# Patient Record
Sex: MALE | Race: WHITE | NOT HISPANIC OR LATINO | Employment: OTHER | URBAN - METROPOLITAN AREA
[De-identification: names, ages, dates, MRNs, and addresses within clinical notes are randomized per-mention and may not be internally consistent; named-entity substitution may affect disease eponyms.]

---

## 2018-08-17 ENCOUNTER — APPOINTMENT (OUTPATIENT)
Dept: RADIOLOGY | Age: 58
End: 2018-08-17
Payer: MEDICARE

## 2018-08-17 ENCOUNTER — TRANSCRIBE ORDERS (OUTPATIENT)
Dept: ADMINISTRATIVE | Age: 58
End: 2018-08-17

## 2018-08-17 DIAGNOSIS — M79.642 PAIN OF LEFT HAND: Primary | ICD-10-CM

## 2018-08-17 DIAGNOSIS — M79.642 PAIN OF LEFT HAND: ICD-10-CM

## 2018-08-17 PROCEDURE — 73130 X-RAY EXAM OF HAND: CPT

## 2019-01-30 ENCOUNTER — OFFICE VISIT (OUTPATIENT)
Dept: VASCULAR SURGERY | Facility: CLINIC | Age: 59
End: 2019-01-30
Payer: MEDICARE

## 2019-01-30 VITALS
TEMPERATURE: 96.9 F | DIASTOLIC BLOOD PRESSURE: 88 MMHG | WEIGHT: 172 LBS | SYSTOLIC BLOOD PRESSURE: 136 MMHG | BODY MASS INDEX: 25.48 KG/M2 | HEIGHT: 69 IN | HEART RATE: 72 BPM

## 2019-01-30 DIAGNOSIS — I70.211 ATHEROSCLEROSIS OF NATIVE ARTERY OF RIGHT LOWER EXTREMITY WITH INTERMITTENT CLAUDICATION (HCC): ICD-10-CM

## 2019-01-30 DIAGNOSIS — I80.8 SUPERFICIAL THROMBOPHLEBITIS OF LEFT UPPER EXTREMITY: Primary | ICD-10-CM

## 2019-01-30 PROBLEM — M54.12 CERVICAL RADICULOPATHY: Status: ACTIVE | Noted: 2017-11-17

## 2019-01-30 PROBLEM — M50.90 CERVICAL DISC DISEASE: Status: ACTIVE | Noted: 2017-11-17

## 2019-01-30 PROBLEM — M25.532 LEFT WRIST PAIN: Status: ACTIVE | Noted: 2019-01-15

## 2019-01-30 PROBLEM — E78.5 DYSLIPIDEMIA: Status: ACTIVE | Noted: 2019-01-15

## 2019-01-30 PROBLEM — I70.219 ATHEROSCLER NATIVE ARTERIES THE EXTREMITIES W/INTERMIT CLAUDICATION (HCC): Status: ACTIVE | Noted: 2019-01-30

## 2019-01-30 PROBLEM — K40.91 RECURRENT RIGHT INGUINAL HERNIA: Status: ACTIVE | Noted: 2017-01-31

## 2019-01-30 PROBLEM — I80.9 SUPERFICIAL THROMBOPHLEBITIS: Status: ACTIVE | Noted: 2019-01-30

## 2019-01-30 PROCEDURE — 99204 OFFICE O/P NEW MOD 45 MIN: CPT | Performed by: SURGERY

## 2019-01-30 RX ORDER — OXYCODONE HYDROCHLORIDE AND ACETAMINOPHEN 5; 325 MG/1; MG/1
1 TABLET ORAL DAILY PRN
Refills: 0 | COMMUNITY
Start: 2018-12-17 | End: 2019-02-27 | Stop reason: HOSPADM

## 2019-01-30 RX ORDER — SILDENAFIL CITRATE 20 MG/1
TABLET ORAL
COMMUNITY
Start: 2017-06-01

## 2019-01-30 RX ORDER — AZITHROMYCIN 250 MG/1
TABLET, FILM COATED ORAL
Refills: 0 | COMMUNITY
Start: 2018-12-31 | End: 2019-01-30 | Stop reason: ALTCHOICE

## 2019-01-30 RX ORDER — CYCLOBENZAPRINE HCL 10 MG
TABLET ORAL
COMMUNITY
Start: 2017-07-12

## 2019-01-30 RX ORDER — NAPROXEN 500 MG/1
500 TABLET ORAL DAILY
COMMUNITY
Start: 2018-02-19

## 2019-01-30 RX ORDER — DIAZEPAM 5 MG/1
TABLET ORAL
COMMUNITY
Start: 2017-12-18 | End: 2019-02-27 | Stop reason: HOSPADM

## 2019-01-30 RX ORDER — MELOXICAM 7.5 MG/1
7.5 TABLET ORAL
COMMUNITY
End: 2019-02-27 | Stop reason: HOSPADM

## 2019-01-30 NOTE — PROGRESS NOTES
Assessment/Plan:    Pt is a 58 yo M w/ chronic HCV, cervical spine disease w/ radilulopathy, ED, diverticulosis, HLD, L wrist pain, R inguinal hernia s/p repair with recurrence, PAD, SVT    Superficial thrombophlebitis of left upper extremity  -     VAS upper limb venous duplex scan, unilateral/limited; Future  -per report, L forearm SVT seen on MRI of hand/wrist  -as symptoms start 3 mos ago, there is only small lumpy areas remaining that may or may not represent SVT  -will get UEV to evaluate for SVT and also for DVT as his initial symptoms was hand swelling    Atherosclerosis of native artery of right lower extremity with intermittent claudication (HCC)  -     VAS lower limb arterial duplex, complete bilateral; Future  -based on sxs and exam, likely PAD w/ RLE claudication  -will get LEADs to better evaluate    Smoking  -current 1PPD smoker  -discussed relationship of smoking to PAD and importance of cessation if we are going to proceed with intervention for his claudication; he has agreed to try cessation    F/U: after ultrasounds complete    Subjective:      Patient ID: Kayli Ballesteros is a 59 y o  male  Patient is new to the practice and was referred by orthopedic at [facility]  Patient complains of numbness and tingling in fingers on right hand  He states that his left hand swelled 4-5 months ago and then developed 2 bumps in inside of left forearm  He states that they do cause him some discomfort at times when he is doing a lot of work with his hand  Denies coldness  Patient is a smoker  HPI:    Patient referred by ortho for evaluation of L arm lumps     ~4mos ago, patient had swelling of his L hand  He saw ortho hand and had an injection in his hand  His swelling slowly resolved but he noticed two "lumps" on his forearm  These have been resolving slowly  He denies any pain in the area  Denies any other instances of this    Denies hx of DVT    Patient complains of cramping in his R calf after walking ~20ft, especially up an incline or with stairs  Denies LLE symptoms  Denies rest pain or wounds  He is a 1 PPD smoker  Not trying to quit        The following portions of the patient's history were reviewed and updated as appropriate: allergies, current medications, past family history, past medical history, past social history, past surgical history and problem list     Review of Systems   Constitutional: Negative  HENT: Negative  Eyes: Negative  Respiratory: Positive for shortness of breath (with activity)  Cardiovascular: Negative  Negative for leg swelling  Gastrointestinal: Negative  Endocrine: Negative  Genitourinary: Negative  Musculoskeletal: Positive for back pain  R calf claudicaiton   Skin: Negative  Allergic/Immunologic: Negative  Neurological: Negative  Hematological: Negative  Psychiatric/Behavioral: Negative  Objective:      /88 (BP Location: Right arm, Patient Position: Sitting)   Pulse 72   Temp (!) 96 9 °F (36 1 °C) (Tympanic)   Ht 5' 9" (1 753 m)   Wt 78 kg (172 lb)   BMI 25 40 kg/m²          Physical Exam   Constitutional: He is oriented to person, place, and time  He appears well-developed and well-nourished  HENT:   Head: Normocephalic and atraumatic  Eyes: Conjunctivae are normal    Neck: Normal range of motion  Neck supple  Cardiovascular: Normal rate, regular rhythm and normal heart sounds  No murmur heard  Pulses:       Radial pulses are 2+ on the right side, and 2+ on the left side  Femoral pulses are 2+ on the right side, and 2+ on the left side  Popliteal pulses are 0 on the right side  Dorsalis pedis pulses are 0 on the right side, and 2+ on the left side  Posterior tibial pulses are 0 on the right side, and 0 on the left side  No carotid bruits B   Pulmonary/Chest: Effort normal  No respiratory distress  He has no wheezes  He has rales  Abdominal: Soft   He exhibits no distension  There is no tenderness  There is no rebound  Musculoskeletal: Normal range of motion  He exhibits no edema  Neurological: He is alert and oriented to person, place, and time  Skin: Skin is warm and dry  L volar forearm with two areas of lumpiness; no erythema, no tenderness   Psychiatric: He has a normal mood and affect  His behavior is normal    Nursing note and vitals reviewed  Vitals:    01/30/19 1336   BP: 136/88   BP Location: Right arm   Patient Position: Sitting   Pulse: 72   Temp: (!) 96 9 °F (36 1 °C)   TempSrc: Tympanic   Weight: 78 kg (172 lb)   Height: 5' 9" (1 753 m)       Patient Active Problem List   Diagnosis    Cervical disc disease    Cervical radiculopathy    Chronic hepatitis C without hepatic coma (HCC)    Diverticulosis of large intestine without hemorrhage    Dyslipidemia    Erectile dysfunction    Left wrist pain    Lumbar disc disease with radiculopathy    Recurrent right inguinal hernia    Tobacco abuse disorder    Umbilical hernia without obstruction and without gangrene    Superficial thrombophlebitis    Atheroscler native arteries the extremities w/intermit claudication (HCC)       Past Surgical History:   Procedure Laterality Date    HERNIA REPAIR      NECK SURGERY  11/2016       Family History   Problem Relation Age of Onset    Diabetes Mother     Cancer Father        Social History     Social History    Marital status: Single     Spouse name: N/A    Number of children: N/A    Years of education: N/A     Occupational History    Not on file       Social History Main Topics    Smoking status: Current Every Day Smoker     Packs/day: 1 00     Types: Cigarettes    Smokeless tobacco: Never Used    Alcohol use Not on file    Drug use: Unknown    Sexual activity: Not on file     Other Topics Concern    Not on file     Social History Narrative    No narrative on file       Allergies   Allergen Reactions    Penicillins Current Outpatient Prescriptions:     cyclobenzaprine (FLEXERIL) 10 mg tablet, TAKE 1 TABLET (10 MG TOTAL) BY MOUTH 2 (TWO) TIMES A DAY AS NEEDED FOR MUSCLE SPASMS , Disp: , Rfl:     naproxen (NAPROSYN) 500 mg tablet, Take 500 mg by mouth, Disp: , Rfl:     oxyCODONE-acetaminophen (PERCOCET) 5-325 mg per tablet, Take 1 tablet by mouth daily as needed, Disp: , Rfl: 0    sildenafil (REVATIO) 20 mg tablet, 3 TO 5 PILLS ORALLY X SINGLE DOSE DAILY PRN, Disp: , Rfl:     diazepam (VALIUM) 5 mg tablet, TAKE 1 TABLET EVERY 8 HOURS AS NEEDED MUSCLE SPASM, Disp: , Rfl:     meloxicam (MOBIC) 7 5 mg tablet, Take 7 5 mg by mouth, Disp: , Rfl:

## 2019-01-30 NOTE — PATIENT INSTRUCTIONS
1) SVT  -the MRI you had showed clot in the superficial veins in your arm; this is not dangerous  -if it happens again, you can use NSAIDs and warm compresses to relieve the discomfort  -we are going to get an ultrasound to make sure there is no deeper clot which can be more dangerous    2) PAD  -the cramping you are having in your calf is called claudication  -we will get an ultrasound to evaluate the level of disease in your blood vessels  -f/u afterward to discuss options for treatment

## 2019-02-06 ENCOUNTER — HOSPITAL ENCOUNTER (OUTPATIENT)
Dept: NON INVASIVE DIAGNOSTICS | Facility: CLINIC | Age: 59
Discharge: HOME | End: 2019-02-06
Payer: MEDICARE

## 2019-02-06 DIAGNOSIS — I80.8 SUPERFICIAL THROMBOPHLEBITIS OF LEFT UPPER EXTREMITY: ICD-10-CM

## 2019-02-06 DIAGNOSIS — I70.211 ATHEROSCLEROSIS OF NATIVE ARTERY OF RIGHT LOWER EXTREMITY WITH INTERMITTENT CLAUDICATION (HCC): ICD-10-CM

## 2019-02-06 PROCEDURE — 93925 LOWER EXTREMITY STUDY: CPT

## 2019-02-06 PROCEDURE — 93925 LOWER EXTREMITY STUDY: CPT | Performed by: SURGERY

## 2019-02-06 PROCEDURE — 93923 UPR/LXTR ART STDY 3+ LVLS: CPT

## 2019-02-06 PROCEDURE — 93922 UPR/L XTREMITY ART 2 LEVELS: CPT | Performed by: SURGERY

## 2019-02-06 PROCEDURE — 93971 EXTREMITY STUDY: CPT

## 2019-02-06 PROCEDURE — 93971 EXTREMITY STUDY: CPT | Performed by: SURGERY

## 2019-02-11 ENCOUNTER — OFFICE VISIT (OUTPATIENT)
Dept: VASCULAR SURGERY | Facility: CLINIC | Age: 59
End: 2019-02-11
Payer: MEDICARE

## 2019-02-11 VITALS
SYSTOLIC BLOOD PRESSURE: 142 MMHG | HEART RATE: 96 BPM | WEIGHT: 173 LBS | HEIGHT: 69 IN | BODY MASS INDEX: 25.62 KG/M2 | DIASTOLIC BLOOD PRESSURE: 98 MMHG | TEMPERATURE: 98.1 F

## 2019-02-11 DIAGNOSIS — I70.211 ATHEROSCLEROSIS OF NATIVE ARTERY OF RIGHT LOWER EXTREMITY WITH INTERMITTENT CLAUDICATION (HCC): Primary | ICD-10-CM

## 2019-02-11 DIAGNOSIS — I80.8 SUPERFICIAL THROMBOPHLEBITIS OF LEFT UPPER EXTREMITY: ICD-10-CM

## 2019-02-11 PROCEDURE — 99215 OFFICE O/P EST HI 40 MIN: CPT | Performed by: SURGERY

## 2019-02-11 RX ORDER — ASPIRIN 81 MG/1
81 TABLET ORAL DAILY
Start: 2019-02-11

## 2019-02-11 RX ORDER — ATORVASTATIN CALCIUM 20 MG/1
20 TABLET, FILM COATED ORAL DAILY
Qty: 90 TABLET | Refills: 4 | Status: SHIPPED | OUTPATIENT
Start: 2019-02-11 | End: 2019-02-27 | Stop reason: HOSPADM

## 2019-02-11 RX ORDER — SODIUM CHLORIDE 9 MG/ML
125 INJECTION, SOLUTION INTRAVENOUS CONTINUOUS
Status: CANCELLED | OUTPATIENT
Start: 2019-02-11

## 2019-02-11 NOTE — PROGRESS NOTES
Assessment/Plan:    Pt is a 58 yo M w/ chronic HCV, cervical spine disease w/ radilulopathy, ED, diverticulosis, HLD, L wrist pain, LUE SVT, R inguinal hernia s/p repair with recurrence, PAD w/ claudication  Atherosclerosis of native artery of right lower extremity with intermittent claudication (HCC)  -     aspirin (ECOTRIN LOW STRENGTH) 81 mg EC tablet; Take 1 tablet (81 mg total) by mouth daily  -     atorvastatin (LIPITOR) 20 mg tablet; Take 1 tablet (20 mg total) by mouth daily  -     Basic metabolic panel; Future  -     CBC and Platelet; Future  -     Protime-INR;  Future  -based on sxs and exam, likely PAD w/ RLE claudication  -reviewed LEADs which show R: 0 78/52/27 and L: 1 17/71/59; R SFA occlusion, L popliteal stenosis; unclear why exercise ABIs were performed but they showed drop to 0 48/1 1  -discussed pathophysiology of PAD and options for treatment including medical management vs intervention with angiogram; discussed risks and benefits of each; I think patient is a good candidate for angiogram, but he must stop smoking first to increase chances of long term patency; patient is agreeable to this; understands that procedure will be cancelled if he is still smoking  -plan for RLE angiogram, L femoral access  -labs (pt planning to get these at [facility] with some other bloodwork he is due for; instructed him to request forwarding to me)    Superficial thrombophlebitis of left upper extremity  -per report, L forearm SVT seen on MRI of hand/wrist  -as symptoms start 3 mos ago, there is only small lumpy areas remaining that may or may not represent SVT  -LUE nena DU was neg for DVT or SVT; possible intramuscular mass vs hematoma, however this was not noted on MRI which would have been more sensative  -as this is not a vascular issue, recommended that patient address any further concerns of lumps or hand swelling to his PCP or ortho    Smoking  -current 1/2PPD smoker (decreased from 1PPD at last visit)  -discussed relationship of smoking to PAD and importance of cessation if we are going to proceed with intervention for his claudication; he has agreed to stop smoking today in preparation for procedure    Medications  -will start patient on aspirin 81mg once daily and atrovastatin 20mg qhs for best medical management of PAD    Other orders  -     Notify Physician in PT/INR greater than 1 8 and/or Creatine Clearance (gFR)  is less than 60  ml/sergio/1 73sq m; Standing  -     Height and Weight Upon Arrival; Standing  -     Nursing communcation Apply snapless gown prior to procedure; Standing  -     Have Patient Void On Call to Procedure Room; Standing  -     Insert and Maintain IV; Standing  -     IR abdominal angiography; Standing  -     Nursing communcation Confirm last dose of any anticoagulation and antiplatelet medication and notify IR; Standing  -     Nursing communcation If patient on diabetic medications, nurse to notify physician to ensure the medications are adjusted preprocedure ; Standing  -     sodium chloride 0 9 % infusion  -     IR abdominal angiography      Operative Scheduling Information:    Hospital:  IR    Physician:  Me    Surgery: RLE angiogram, L femoral access    Urgency:  Standard (patient must have stopped smoking; please ask when scheduling him)    Case Length:  Normal    Post-op Bed:  Outpatient    OR Table:  IR    Equipment Needs:  None    Medication Instructions:  Aspirin:   Continue (do not hold)    Hydration:  No      Subjective:      Patient ID: Claudia Perez is a 59 y o  male  Patient presents today to discuss results of upper limb venous duplex and JANA  Patient complains of minimal left hand swelling  He also complains of tightness, cramping and pain in right leg when walking and is worsened with steps or elevation  Denies rest pain  Patient is a smoker  HPI:    Patient initially referred by ortho for evaluation of L arm lumps    Presents today to review studies  ~4mos ago, patient had swelling of his L hand  He saw ortho hand and had an injection in his hand  His swelling slowly resolved but he noticed two "lumps" on his forearm  These have been resolving slowly  He denies any pain in the area  Denies any other instances of this  Denies hx of DVT    Patient complains of cramping in his R calf after walking ~20ft, especially up an incline or with stairs  Denies LLE symptoms (maybe with very long distance? ?)  Denies rest pain or wounds  He is a 1/2 PPD smoker, down from 1PPD at last visit  He seems motivated to quit to get his PAD fixed  The following portions of the patient's history were reviewed and updated as appropriate: allergies, current medications, past family history, past medical history, past social history, past surgical history and problem list       Review of Systems   Constitutional: Negative  HENT: Negative  Eyes: Negative  Respiratory: Negative for shortness of breath  Cardiovascular: Negative  Negative for chest pain and leg swelling  Gastrointestinal: Negative  Endocrine: Negative  Genitourinary: Negative  Musculoskeletal: Positive for gait problem (RLE claudication <1block)  L forearm bumps    L hand swelling   Skin: Negative  Allergic/Immunologic: Negative  Hematological: Negative  Psychiatric/Behavioral: Negative  Objective:      /98 (BP Location: Right arm, Patient Position: Sitting)   Pulse 96   Temp 98 1 °F (36 7 °C) (Tympanic)   Ht 5' 9" (1 753 m)   Wt 78 5 kg (173 lb)   BMI 25 55 kg/m²          Physical Exam   Constitutional: He is oriented to person, place, and time  He appears well-developed and well-nourished  HENT:   Head: Normocephalic and atraumatic  Eyes: Conjunctivae are normal    Neck: Normal range of motion  Neck supple  Cardiovascular: Normal rate, regular rhythm and normal heart sounds  No murmur heard    Pulses:       Radial pulses are 2+ on the right side, and 2+ on the left side  Femoral pulses are 2+ on the right side, and 2+ on the left side  Popliteal pulses are 0 on the right side  Dorsalis pedis pulses are 0 on the right side, and 2+ on the left side  Posterior tibial pulses are 0 on the right side, and 0 on the left side  No carotid bruits B   Pulmonary/Chest: Effort normal  No respiratory distress  He has no wheezes  He has rales  Abdominal: Soft  He exhibits no distension  There is no tenderness  There is no rebound  Musculoskeletal: Normal range of motion  He exhibits no edema  Neurological: He is alert and oriented to person, place, and time  Skin: Skin is warm and dry  L volar forearm with two areas of lumpiness; no erythema, no tenderness   Psychiatric: He has a normal mood and affect  His behavior is normal    Nursing note and vitals reviewed          Vitals:    02/11/19 1403   BP: 142/98   BP Location: Right arm   Patient Position: Sitting   Pulse: 96   Temp: 98 1 °F (36 7 °C)   TempSrc: Tympanic   Weight: 78 5 kg (173 lb)   Height: 5' 9" (1 753 m)       Patient Active Problem List   Diagnosis    Cervical disc disease    Cervical radiculopathy    Chronic hepatitis C without hepatic coma (HCC)    Diverticulosis of large intestine without hemorrhage    Dyslipidemia    Erectile dysfunction    Left wrist pain    Lumbar disc disease with radiculopathy    Recurrent right inguinal hernia    Tobacco abuse disorder    Umbilical hernia without obstruction and without gangrene    Superficial thrombophlebitis    Atheroscler native arteries the extremities w/intermit claudication [facility])       Past Surgical History:   Procedure Laterality Date    HERNIA REPAIR      NECK SURGERY  11/2016       Family History   Problem Relation Age of Onset    Diabetes Mother     Cancer Father        Social History     Socioeconomic History    Marital status: Single     Spouse name: Not on file    Number of children: Not on file    Years of education: Not on file    Highest education level: Not on file   Occupational History    Not on file   Social Needs    Financial resource strain: Not on file    Food insecurity:     Worry: Not on file     Inability: Not on file    Transportation needs:     Medical: Not on file     Non-medical: Not on file   Tobacco Use    Smoking status: Current Every Day Smoker     Packs/day: 0 50     Types: Cigarettes    Smokeless tobacco: Never Used   Substance and Sexual Activity    Alcohol use: Not on file    Drug use: Not on file    Sexual activity: Not on file   Lifestyle    Physical activity:     Days per week: Not on file     Minutes per session: Not on file    Stress: Not on file   Relationships    Social connections:     Talks on phone: Not on file     Gets together: Not on file     Attends [employer] service: Not on file     Active member of club or organization: Not on file     Attends meetings of clubs or organizations: Not on file     Relationship status: Not on file    Intimate partner violence:     Fear of current or ex partner: Not on file     Emotionally abused: Not on file     Physically abused: Not on file     Forced sexual activity: Not on file   Other Topics Concern    Not on file   Social History Narrative    Not on file       Allergies   Allergen Reactions    Penicillins          Current Outpatient Medications:     cyclobenzaprine (FLEXERIL) 10 mg tablet, TAKE 1 TABLET (10 MG TOTAL) BY MOUTH 2 (TWO) TIMES A DAY AS NEEDED FOR MUSCLE SPASMS , Disp: , Rfl:     diazepam (VALIUM) 5 mg tablet, TAKE 1 TABLET EVERY 8 HOURS AS NEEDED MUSCLE SPASM, Disp: , Rfl:     meloxicam (MOBIC) 7 5 mg tablet, Take 7 5 mg by mouth, Disp: , Rfl:     naproxen (NAPROSYN) 500 mg tablet, Take 500 mg by mouth, Disp: , Rfl:     oxyCODONE-acetaminophen (PERCOCET) 5-325 mg per tablet, Take 1 tablet by mouth daily as needed, Disp: , Rfl: 0    sildenafil (REVATIO) 20 mg tablet, 3 TO 5 PILLS ORALLY X SINGLE DOSE DAILY PRN, Disp: , Rfl:     aspirin (ECOTRIN LOW STRENGTH) 81 mg EC tablet, Take 1 tablet (81 mg total) by mouth daily, Disp: , Rfl:     atorvastatin (LIPITOR) 20 mg tablet, Take 1 tablet (20 mg total) by mouth daily, Disp: 90 tablet, Rfl: 4

## 2019-02-11 NOTE — PATIENT INSTRUCTIONS
1) SVT  -the MRI you had showed clot in the superficial veins in your arm; this is not dangerous  -if it happens again, you can use NSAIDs and warm compresses to relieve the discomfort  -the ultrasound did not show any clot in any of the veins; it possibly showed something in the muscle, however, this was not mentioned on the MRI  -if the lumps are still bothering you, please discuss next steps for workup with your PCP    2) PAD  -the cramping you are having in your calf is called claudication  -the ultrasound shows blockages in the right thigh and at the left knee  -we are planning an angiogram to try and treat these blockages  -in order for this to have the greatest chance of success, I need you to stop smoking; if you are still smoking on the day of the procedure, it will be cancelled    Angiogram   WHAT YOU NEED TO KNOW:   What do I need to know about an angiogram?  An angiogram is used to examine blood flow through your arteries  Arteries carry blood from your heart to your body  How do I prepare for the procedure? Your healthcare provider will tell you how to prepare for your procedure  He may tell you not to eat or drink anything after midnight on the day of your procedure  He will tell you what medicines to take or not take on the day of your procedure  Contrast liquid will be used during the procedure to help your arteries show up better in the pictures  Tell your healthcare provider if you have ever had an allergic reaction to contrast liquid  Arrange to have someone drive you home after your procedure  What will happen during the procedure? · You may be given medicine to help you relax or make you drowsy  You may get local anesthesia to numb the area where the angiogram catheter will go in  Hair may be removed from the procedure site  · A catheter will be put into an artery in your leg near your groin, or in your arm  The catheter travels through the artery to the area being studied   Contrast liquid is put through the catheter to help your blood vessels and organs show up better in the x-ray pictures  You may feel warm as the liquid is put into the catheter  You may get a headache or feel nauseated  These feelings should go away quickly  · Your healthcare providers will remove the catheter and apply pressure to the wound for several minutes  They may place a pressure bandage or other pressure device over the wound to help stop any bleeding  What will happen after the procedure? You will be on a heart monitor until you are fully awake  A heart monitor continuously records the electrical activity of your heart  Healthcare providers will frequently monitor your vital signs and pulses  They will also frequently check your wound for bleeding  Keep your leg straight  Do not  get out of bed until your healthcare provider says it is okay  After you are monitored for several hours, you may be able to go home  What are the risks of the procedure? · You may bleed heavily after your catheter is removed  The catheter may damage your artery, and you may need surgery to fix the damage  You could have kidney problems from the contrast liquid  You could have an allergic reaction to the contrast liquid or numbing medicine  · You may develop a blood clot that causes pain and swelling, and stops blood from flowing  A blood clot in your leg can break loose and travel to your lungs and become life-threatening  CARE AGREEMENT:   You have the right to help plan your care  Learn about your health condition and how it may be treated  Discuss treatment options with your caregivers to decide what care you want to receive  You always have the right to refuse treatment  The above information is an  only  It is not intended as medical advice for individual conditions or treatments  Talk to your doctor, nurse or pharmacist before following any medical regimen to see if it is safe and effective for you    © 2017 [facility] Information is for End User's use only and may not be sold, redistributed or otherwise used for commercial purposes  All illustrations and images included in CareNotes® are the copyrighted property of A D A M , Inc  or [employer]

## 2019-02-13 ENCOUNTER — TELEPHONE (OUTPATIENT)
Dept: VASCULAR SURGERY | Facility: CLINIC | Age: 59
End: 2019-02-13

## 2019-02-13 NOTE — TELEPHONE ENCOUNTER
/LMOM for pt to call back to schedule  Agram with Dr Tatiana Huynh Doctor  Pt returned call and we confirmed Agram for 2/19/19 at [facility]/Cath [facility] with Dr Tatiana Huynh Doctor  Pt will go for blood work ordered  Instructions reviewed and understood

## 2019-02-15 ENCOUNTER — TELEPHONE (OUTPATIENT)
Dept: VASCULAR SURGERY | Facility: CLINIC | Age: 59
End: 2019-02-15

## 2019-02-15 ENCOUNTER — PREP FOR PROCEDURE (OUTPATIENT)
Dept: VASCULAR SURGERY | Facility: CLINIC | Age: 59
End: 2019-02-15

## 2019-02-15 DIAGNOSIS — I70.211 ATHEROSCLEROSIS OF NATIVE ARTERY OF RIGHT LOWER EXTREMITY WITH INTERMITTENT CLAUDICATION (HCC): Primary | ICD-10-CM

## 2019-02-15 NOTE — TELEPHONE ENCOUNTER
LMOM regarding his Agram that is scheduled with Dr Delpha Romberg Doctor on 219/19  Dr Delpha Romberg is now unavailable to do this case  We are rescheduling the Agram to 2/28/19 at [facility] for Dr Wilder Latif to do  I have asked that the patient call me back to confirm

## 2019-02-18 ENCOUNTER — TELEPHONE (OUTPATIENT)
Dept: VASCULAR SURGERY | Facility: CLINIC | Age: 59
End: 2019-02-18

## 2019-02-18 NOTE — TELEPHONE ENCOUNTER
Spoke to patient to reschedule surgery to 2-28-19 [facility] with Dr Nury Flores since Dr Tho Musa is on [facility] Patient was okay with the change   LLF

## 2019-02-23 ENCOUNTER — APPOINTMENT (EMERGENCY)
Dept: RADIOLOGY | Facility: HOSPITAL | Age: 59
DRG: 281 | End: 2019-02-23
Payer: MEDICARE

## 2019-02-23 ENCOUNTER — HOSPITAL ENCOUNTER (INPATIENT)
Facility: HOSPITAL | Age: 59
LOS: 4 days | Discharge: HOME | DRG: 281 | End: 2019-02-27
Attending: EMERGENCY MEDICINE | Admitting: HOSPITALIST
Payer: MEDICARE

## 2019-02-23 DIAGNOSIS — R06.03 ACUTE RESPIRATORY DISTRESS: ICD-10-CM

## 2019-02-23 DIAGNOSIS — M50.90 CERVICAL DISC DISEASE: ICD-10-CM

## 2019-02-23 DIAGNOSIS — R77.8 ELEVATED TROPONIN: ICD-10-CM

## 2019-02-23 DIAGNOSIS — I42.8 NON-ISCHEMIC CARDIOMYOPATHY (HCC): ICD-10-CM

## 2019-02-23 DIAGNOSIS — I50.9 CHF (CONGESTIVE HEART FAILURE) (HCC): Primary | ICD-10-CM

## 2019-02-23 DIAGNOSIS — R94.31 EKG ABNORMALITIES: ICD-10-CM

## 2019-02-23 DIAGNOSIS — I50.21 ACUTE SYSTOLIC (CONGESTIVE) HEART FAILURE (HCC): ICD-10-CM

## 2019-02-23 DIAGNOSIS — E78.5 DYSLIPIDEMIA: Chronic | ICD-10-CM

## 2019-02-23 PROBLEM — I73.9 PERIPHERAL VASCULAR DISEASE (HCC): Chronic | Status: ACTIVE | Noted: 2019-02-23

## 2019-02-23 PROBLEM — R73.09 ELEVATED RANDOM BLOOD GLUCOSE LEVEL: Status: ACTIVE | Noted: 2019-02-23

## 2019-02-23 PROBLEM — R06.00 DYSPNEA ON EXERTION: Status: ACTIVE | Noted: 2019-02-23

## 2019-02-23 PROBLEM — I73.9 PERIPHERAL VASCULAR DISEASE (HCC): Status: ACTIVE | Noted: 2019-02-23

## 2019-02-23 PROBLEM — I21.4 NSTEMI (NON-ST ELEVATED MYOCARDIAL INFARCTION) (HCC): Status: ACTIVE | Noted: 2019-02-23

## 2019-02-23 LAB
ALBUMIN SERPL BCP-MCNC: 3.5 G/DL (ref 3.5–5)
ALP SERPL-CCNC: 85 U/L (ref 46–116)
ALT SERPL W P-5'-P-CCNC: 25 U/L (ref 12–78)
ANION GAP SERPL CALCULATED.3IONS-SCNC: 7 MMOL/L (ref 4–13)
APTT PPP: 30 SECONDS (ref 26–38)
APTT PPP: 39 SECONDS (ref 26–38)
APTT PPP: 45 SECONDS (ref 26–38)
AST SERPL W P-5'-P-CCNC: 11 U/L (ref 5–45)
BASOPHILS # BLD AUTO: 0.08 THOUSANDS/ΜL (ref 0–0.1)
BASOPHILS NFR BLD AUTO: 1 % (ref 0–1)
BILIRUB SERPL-MCNC: 0.24 MG/DL (ref 0.2–1)
BUN SERPL-MCNC: 16 MG/DL (ref 5–25)
CALCIUM SERPL-MCNC: 8.9 MG/DL (ref 8.3–10.1)
CHLORIDE SERPL-SCNC: 105 MMOL/L (ref 100–108)
CO2 SERPL-SCNC: 27 MMOL/L (ref 21–32)
CREAT SERPL-MCNC: 1.11 MG/DL (ref 0.6–1.3)
DEPRECATED D DIMER PPP: 508 NG/ML (FEU)
EOSINOPHIL # BLD AUTO: 0.44 THOUSAND/ΜL (ref 0–0.61)
EOSINOPHIL NFR BLD AUTO: 4 % (ref 0–6)
ERYTHROCYTE [DISTWIDTH] IN BLOOD BY AUTOMATED COUNT: 12.5 % (ref 11.6–15.1)
GFR SERPL CREATININE-BSD FRML MDRD: 72 ML/MIN/1.73SQ M
GLUCOSE SERPL-MCNC: 179 MG/DL (ref 65–140)
HCT VFR BLD AUTO: 48.2 % (ref 36.5–49.3)
HGB BLD-MCNC: 15.4 G/DL (ref 12–17)
IMM GRANULOCYTES # BLD AUTO: 0.04 THOUSAND/UL (ref 0–0.2)
IMM GRANULOCYTES NFR BLD AUTO: 0 % (ref 0–2)
INR PPP: 1.05 (ref 0.86–1.17)
LYMPHOCYTES # BLD AUTO: 2.92 THOUSANDS/ΜL (ref 0.6–4.47)
LYMPHOCYTES NFR BLD AUTO: 28 % (ref 14–44)
MCH RBC QN AUTO: 29.7 PG (ref 26.8–34.3)
MCHC RBC AUTO-ENTMCNC: 32 G/DL (ref 31.4–37.4)
MCV RBC AUTO: 93 FL (ref 82–98)
MONOCYTES # BLD AUTO: 0.77 THOUSAND/ΜL (ref 0.17–1.22)
MONOCYTES NFR BLD AUTO: 8 % (ref 4–12)
NEUTROPHILS # BLD AUTO: 6.08 THOUSANDS/ΜL (ref 1.85–7.62)
NEUTS SEG NFR BLD AUTO: 59 % (ref 43–75)
NRBC BLD AUTO-RTO: 0 /100 WBCS
NT-PROBNP SERPL-MCNC: 3544 PG/ML
PLATELET # BLD AUTO: 430 THOUSANDS/UL (ref 149–390)
PMV BLD AUTO: 8.8 FL (ref 8.9–12.7)
POTASSIUM SERPL-SCNC: 4.4 MMOL/L (ref 3.5–5.3)
PROT SERPL-MCNC: 7.7 G/DL (ref 6.4–8.2)
PROTHROMBIN TIME: 13.8 SECONDS (ref 11.8–14.2)
RBC # BLD AUTO: 5.18 MILLION/UL (ref 3.88–5.62)
SODIUM SERPL-SCNC: 139 MMOL/L (ref 136–145)
TROPONIN I SERPL-MCNC: 0.11 NG/ML
TROPONIN I SERPL-MCNC: 0.4 NG/ML
TROPONIN I SERPL-MCNC: 1.51 NG/ML
TROPONIN I SERPL-MCNC: 10.2 NG/ML
TROPONIN I SERPL-MCNC: 5.56 NG/ML
TROPONIN I SERPL-MCNC: 8.35 NG/ML
WBC # BLD AUTO: 10.33 THOUSAND/UL (ref 4.31–10.16)

## 2019-02-23 PROCEDURE — 83880 ASSAY OF NATRIURETIC PEPTIDE: CPT | Performed by: EMERGENCY MEDICINE

## 2019-02-23 PROCEDURE — 84484 ASSAY OF TROPONIN QUANT: CPT | Performed by: EMERGENCY MEDICINE

## 2019-02-23 PROCEDURE — 99221 1ST HOSP IP/OBS SF/LOW 40: CPT | Performed by: INTERNAL MEDICINE

## 2019-02-23 PROCEDURE — 85610 PROTHROMBIN TIME: CPT | Performed by: HOSPITALIST

## 2019-02-23 PROCEDURE — 99223 1ST HOSP IP/OBS HIGH 75: CPT | Performed by: HOSPITALIST

## 2019-02-23 PROCEDURE — 71046 X-RAY EXAM CHEST 2 VIEWS: CPT

## 2019-02-23 PROCEDURE — RECHECK: Performed by: PHYSICIAN ASSISTANT

## 2019-02-23 PROCEDURE — 85025 COMPLETE CBC W/AUTO DIFF WBC: CPT | Performed by: EMERGENCY MEDICINE

## 2019-02-23 PROCEDURE — 93005 ELECTROCARDIOGRAM TRACING: CPT

## 2019-02-23 PROCEDURE — 85379 FIBRIN DEGRADATION QUANT: CPT | Performed by: EMERGENCY MEDICINE

## 2019-02-23 PROCEDURE — 80053 COMPREHEN METABOLIC PANEL: CPT | Performed by: EMERGENCY MEDICINE

## 2019-02-23 PROCEDURE — 84484 ASSAY OF TROPONIN QUANT: CPT | Performed by: HOSPITALIST

## 2019-02-23 PROCEDURE — 85730 THROMBOPLASTIN TIME PARTIAL: CPT | Performed by: INTERNAL MEDICINE

## 2019-02-23 PROCEDURE — 84484 ASSAY OF TROPONIN QUANT: CPT | Performed by: INTERNAL MEDICINE

## 2019-02-23 PROCEDURE — 99285 EMERGENCY DEPT VISIT HI MDM: CPT

## 2019-02-23 PROCEDURE — 36415 COLL VENOUS BLD VENIPUNCTURE: CPT | Performed by: EMERGENCY MEDICINE

## 2019-02-23 PROCEDURE — 85730 THROMBOPLASTIN TIME PARTIAL: CPT | Performed by: HOSPITALIST

## 2019-02-23 RX ORDER — HEPARIN SODIUM 1000 [USP'U]/ML
4000 INJECTION, SOLUTION INTRAVENOUS; SUBCUTANEOUS ONCE
Status: COMPLETED | OUTPATIENT
Start: 2019-02-23 | End: 2019-02-23

## 2019-02-23 RX ORDER — DIAZEPAM 2 MG/1
2 TABLET ORAL EVERY 8 HOURS PRN
Status: DISCONTINUED | OUTPATIENT
Start: 2019-02-23 | End: 2019-02-27 | Stop reason: HOSPADM

## 2019-02-23 RX ORDER — HEPARIN SODIUM 10000 [USP'U]/100ML
3-20 INJECTION, SOLUTION INTRAVENOUS
Status: DISCONTINUED | OUTPATIENT
Start: 2019-02-23 | End: 2019-02-25

## 2019-02-23 RX ORDER — FUROSEMIDE 10 MG/ML
20 INJECTION INTRAMUSCULAR; INTRAVENOUS ONCE
Status: COMPLETED | OUTPATIENT
Start: 2019-02-23 | End: 2019-02-23

## 2019-02-23 RX ORDER — ASPIRIN 81 MG/1
324 TABLET, CHEWABLE ORAL ONCE
Status: COMPLETED | OUTPATIENT
Start: 2019-02-23 | End: 2019-02-23

## 2019-02-23 RX ORDER — FUROSEMIDE 10 MG/ML
40 INJECTION INTRAMUSCULAR; INTRAVENOUS
Status: DISCONTINUED | OUTPATIENT
Start: 2019-02-23 | End: 2019-02-23

## 2019-02-23 RX ORDER — ATORVASTATIN CALCIUM 20 MG/1
20 TABLET, FILM COATED ORAL DAILY
Status: DISCONTINUED | OUTPATIENT
Start: 2019-02-23 | End: 2019-02-23

## 2019-02-23 RX ORDER — CLOPIDOGREL BISULFATE 75 MG/1
300 TABLET ORAL ONCE
Status: COMPLETED | OUTPATIENT
Start: 2019-02-23 | End: 2019-02-23

## 2019-02-23 RX ORDER — POLYETHYLENE GLYCOL 3350 17 G/17G
17 POWDER, FOR SOLUTION ORAL DAILY PRN
Status: DISCONTINUED | OUTPATIENT
Start: 2019-02-23 | End: 2019-02-27 | Stop reason: HOSPADM

## 2019-02-23 RX ORDER — CLOPIDOGREL BISULFATE 75 MG/1
75 TABLET ORAL DAILY
Status: DISCONTINUED | OUTPATIENT
Start: 2019-02-24 | End: 2019-02-25

## 2019-02-23 RX ORDER — ONDANSETRON 2 MG/ML
4 INJECTION INTRAMUSCULAR; INTRAVENOUS EVERY 8 HOURS PRN
Status: DISCONTINUED | OUTPATIENT
Start: 2019-02-23 | End: 2019-02-27 | Stop reason: HOSPADM

## 2019-02-23 RX ORDER — DOCUSATE SODIUM 100 MG/1
100 CAPSULE, LIQUID FILLED ORAL 2 TIMES DAILY
Status: DISCONTINUED | OUTPATIENT
Start: 2019-02-23 | End: 2019-02-27 | Stop reason: HOSPADM

## 2019-02-23 RX ORDER — FUROSEMIDE 10 MG/ML
20 INJECTION INTRAMUSCULAR; INTRAVENOUS DAILY
Status: DISCONTINUED | OUTPATIENT
Start: 2019-02-24 | End: 2019-02-26

## 2019-02-23 RX ORDER — ASPIRIN 81 MG/1
81 TABLET ORAL DAILY
Status: DISCONTINUED | OUTPATIENT
Start: 2019-02-23 | End: 2019-02-27 | Stop reason: HOSPADM

## 2019-02-23 RX ORDER — ATORVASTATIN CALCIUM 80 MG/1
80 TABLET, FILM COATED ORAL ONCE
Status: COMPLETED | OUTPATIENT
Start: 2019-02-23 | End: 2019-02-23

## 2019-02-23 RX ORDER — ATORVASTATIN CALCIUM 40 MG/1
40 TABLET, FILM COATED ORAL
Status: DISCONTINUED | OUTPATIENT
Start: 2019-02-23 | End: 2019-02-27 | Stop reason: HOSPADM

## 2019-02-23 RX ORDER — ACETAMINOPHEN 325 MG/1
650 TABLET ORAL EVERY 6 HOURS PRN
Status: DISCONTINUED | OUTPATIENT
Start: 2019-02-23 | End: 2019-02-27 | Stop reason: HOSPADM

## 2019-02-23 RX ORDER — MELOXICAM 7.5 MG/1
7.5 TABLET ORAL DAILY
Status: DISCONTINUED | OUTPATIENT
Start: 2019-02-23 | End: 2019-02-27 | Stop reason: HOSPADM

## 2019-02-23 RX ORDER — OXYCODONE HYDROCHLORIDE AND ACETAMINOPHEN 5; 325 MG/1; MG/1
1 TABLET ORAL EVERY 8 HOURS PRN
Status: DISCONTINUED | OUTPATIENT
Start: 2019-02-23 | End: 2019-02-27 | Stop reason: HOSPADM

## 2019-02-23 RX ORDER — NICOTINE 21 MG/24HR
1 PATCH, TRANSDERMAL 24 HOURS TRANSDERMAL DAILY
Status: DISCONTINUED | OUTPATIENT
Start: 2019-02-23 | End: 2019-02-27 | Stop reason: HOSPADM

## 2019-02-23 RX ORDER — SENNOSIDES 8.6 MG
1 TABLET ORAL DAILY
Status: DISCONTINUED | OUTPATIENT
Start: 2019-02-23 | End: 2019-02-27 | Stop reason: HOSPADM

## 2019-02-23 RX ORDER — CYCLOBENZAPRINE HCL 10 MG
10 TABLET ORAL 2 TIMES DAILY PRN
Status: DISCONTINUED | OUTPATIENT
Start: 2019-02-23 | End: 2019-02-27 | Stop reason: HOSPADM

## 2019-02-23 RX ADMIN — ATORVASTATIN CALCIUM 80 MG: 80 TABLET, FILM COATED ORAL at 08:51

## 2019-02-23 RX ADMIN — MELOXICAM 7.5 MG: 7.5 TABLET ORAL at 11:38

## 2019-02-23 RX ADMIN — HEPARIN SODIUM AND DEXTROSE 12 UNITS/KG/HR: 10000; 5 INJECTION INTRAVENOUS at 08:07

## 2019-02-23 RX ADMIN — ATORVASTATIN CALCIUM 40 MG: 40 TABLET, FILM COATED ORAL at 18:13

## 2019-02-23 RX ADMIN — HEPARIN SODIUM 4000 UNITS: 1000 INJECTION, SOLUTION INTRAVENOUS; SUBCUTANEOUS at 08:07

## 2019-02-23 RX ADMIN — FUROSEMIDE 40 MG: 10 INJECTION, SOLUTION INTRAMUSCULAR; INTRAVENOUS at 07:53

## 2019-02-23 RX ADMIN — METOPROLOL TARTRATE 25 MG: 25 TABLET, FILM COATED ORAL at 21:01

## 2019-02-23 RX ADMIN — FUROSEMIDE 20 MG: 10 INJECTION, SOLUTION INTRAMUSCULAR; INTRAVENOUS at 06:36

## 2019-02-23 RX ADMIN — METOPROLOL TARTRATE 25 MG: 25 TABLET, FILM COATED ORAL at 13:33

## 2019-02-23 RX ADMIN — CLOPIDOGREL BISULFATE 300 MG: 75 TABLET ORAL at 15:30

## 2019-02-23 RX ADMIN — DOCUSATE SODIUM 100 MG: 100 CAPSULE, LIQUID FILLED ORAL at 18:13

## 2019-02-23 RX ADMIN — ASPIRIN 81 MG 324 MG: 81 TABLET ORAL at 04:48

## 2019-02-23 RX ADMIN — DOCUSATE SODIUM 100 MG: 100 CAPSULE, LIQUID FILLED ORAL at 08:51

## 2019-02-23 NOTE — CONSULTS
Consultation - Cardiology Team One  Kayli Ballesteros 59 y o  male MRN: [id number]  Unit/Bed#: [facility] 427-01 Encounter: [id number]    Inpatient consult to Cardiology  Consult performed by: JAME Calvo  Consult ordered by: Sincere Golden MD      Physician Requesting Consult: Sincere Golden MD  Reason for Consult / Principal Problem: Elevated troponin       Assessment/ Plan    1  Dyspnea on exertion concerning for acute CHF vs #2   ProBNP 3544  Chest x-ray reviewed showing vascular congestion  Started on IV Lasix  Echocardiogram pending    2  Elevated troponin concerning for type 1 non STEMI  Troponin 0 11/0 4/1 5  Continue heparin drip  Continue aspirin and statin  Will start beta-blocker  Reviewed ECG normal sinus rhythm with more pronounced T-wave inversions in lateral leads  Plan for cardiac cath Monday  3  Tobacco abuse- counseled on smoking cessation  Continue nicotine patch    4  Peripheral artery disease with claudication- followed by vascular surgery  Continue statin    History of Present Illness   HPI: Kayli Ballesteros is a 59y o  year old male who has a history of  hyperlipidemia, tobacco abuse, PAD with claudication and hepatitis C  He does not follow with a cardiologist  He has no history of known CAD, heart failure or dysrhythmias  He presents to [facility] ER 02/23/2019 with complaint of shortness of breath  Patient reports shortness of breath for last several days but worsening overnight with left arm pain  He denies cough, fevers or chills  He does report shortness of breath with exertion and symptoms of orthopnea  He denies lower extremity edema, abdominal bloating or weight gain  He denies chest pain or pressure at rest or with exertion  He lives at home independently  He is fairly active  He is currently on 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  He reports no chest pain this past week during the snowstorm when he was working    He does report having dyspnea on exertion while shoveling  He smokes 1 pack per day and reports social alcohol use  He has significant family history for coronary artery disease including both mother and father had CABG in their 62s  Exercise stress test 07/24/2015  Patient exercised for 9 minutes and target heart rate was achieved  ECG was negative for ischemia  EKG reviewed personally:  Normal sinus rhythm with T-wave inversions in the lateral leads and nonspecific intraventricular conduction delay  Ventricular rate 87 bpm  NM interval 184 ms  QRS D interval 128 ms  QT interval 352 ms  QTC interval 423 ms    Telemetry reviewed personally:   Normal sinus rhythm HR 90s    Review of Systems   Constitution: Positive for malaise/fatigue  Negative for chills and fever  Cardiovascular: Positive for dyspnea on exertion and orthopnea  Negative for chest pain, leg swelling and palpitations  Respiratory: Negative for cough  Gastrointestinal: Negative for bloating, nausea and vomiting  Neurological: Negative for dizziness, light-headedness and weakness  Psychiatric/Behavioral: Negative for altered mental status  Historical Information   History reviewed  No pertinent past medical history    Past Surgical History:   Procedure Laterality Date    HERNIA REPAIR      NECK SURGERY  11/2016     Social History     Substance and Sexual Activity   Alcohol Use Yes    Comment: social     Social History     Substance and Sexual Activity   Drug Use Not Currently     Social History     Tobacco Use   Smoking Status Current Every Day Smoker    Packs/day: 0 50    Types: Cigarettes   Smokeless Tobacco Never Used     Family History:   Family History   Problem Relation Age of Onset    Diabetes Mother     Cancer Father        Meds/Allergies   all current active meds have been reviewed and current meds:   Current Facility-Administered Medications   Medication Dose Route Frequency    acetaminophen (TYLENOL) tablet 650 mg  650 mg Oral Q6H PRN    aspirin (ECOTRIN LOW STRENGTH) EC tablet 81 mg  81 mg Oral Daily    atorvastatin (LIPITOR) tablet 40 mg  40 mg Oral After Dinner    cyclobenzaprine (FLEXERIL) tablet 10 mg  10 mg Oral BID PRN    diazepam (VALIUM) tablet 2 mg  2 mg Oral Q8H PRN    docusate sodium (COLACE) capsule 100 mg  100 mg Oral BID    furosemide (LASIX) injection 40 mg  40 mg Intravenous BID (diuretic)    heparin (porcine) 25,000 units in 250 mL infusion (premix)  3-20 Units/kg/hr (Order-Specific) Intravenous Titrated    meloxicam (MOBIC) tablet 7 5 mg  7 5 mg Oral Daily    nicotine (NICODERM CQ) 21 mg/24 hr TD 24 hr patch 1 patch  1 patch Transdermal Daily    ondansetron (ZOFRAN) injection 4 mg  4 mg Intravenous Q8H PRN    oxyCODONE-acetaminophen (PERCOCET) 5-325 mg per tablet 1 tablet  1 tablet Oral Q8H PRN    polyethylene glycol (MIRALAX) packet 17 g  17 g Oral Daily PRN    senna (SENOKOT) tablet 8 6 mg  1 tablet Oral Daily       heparin (porcine) 3-20 Units/kg/hr (Order-Specific) Last Rate: 12 Units/kg/hr (02/23/19 2068)       Allergies   Allergen Reactions    Penicillins        Objective   Vitals: Blood pressure 121/87, pulse 94, temperature 97 7 °F (36 5 °C), temperature source Oral, resp  rate 20, weight 79 4 kg (175 lb), SpO2 99 %  ,     Body mass index is 25 84 kg/m²  ,     Systolic (10JAD), WBU:197 , Min:121 , DFF:113     Diastolic (93ONR), ZVE:53, Min:87, Max:105            Intake/Output Summary (Last 24 hours) at 2/23/2019 1201  Last data filed at 2/23/2019 0920  Gross per 24 hour   Intake    Output 1700 ml   Net -1700 ml     Weight (last 2 days)     Date/Time   Weight    02/23/19 0349   79 4 (175)            Invasive Devices     Peripheral Intravenous Line            Peripheral IV 02/23/19 Right Antecubital less than 1 day    Peripheral IV 02/23/19 Right Forearm less than 1 day                  Physical Exam   Constitutional: He is oriented to person, place, and time  No distress  Neck: Neck supple  Cardiovascular: Normal rate, regular rhythm, normal heart sounds and intact distal pulses  Pulmonary/Chest: Effort normal  No respiratory distress  Course   Crackles in bases   RA   Abdominal: Soft  Bowel sounds are normal    Musculoskeletal: He exhibits no edema  Neurological: He is alert and oriented to person, place, and time  Skin: Skin is warm and dry  He is not diaphoretic  Psychiatric: He has a normal mood and affect  His behavior is normal    Tearful      LABORATORY RESULTS:  Results from last 7 days   Lab Units 02/23/19  0923 02/23/19  0617 02/23/19  0354   TROPONIN I ng/mL 1 51* 0 40* 0 11*     CBC with diff: Results from last 7 days   Lab Units 02/23/19  0354   WBC Thousand/uL 10 33*   HEMOGLOBIN g/dL 15 4   HEMATOCRIT % 48 2   MCV fL 93   PLATELETS Thousands/uL 430*   MCH pg 29 7   MCHC g/dL 32 0   RDW % 12 5   MPV fL 8 8*   NRBC AUTO /100 WBCs 0       CMP:  Results from last 7 days   Lab Units 02/23/19  0355   POTASSIUM mmol/L 4 4   CHLORIDE mmol/L 105   CO2 mmol/L 27   BUN mg/dL 16   CREATININE mg/dL 1 11   CALCIUM mg/dL 8 9   AST U/L 11   ALT U/L 25   ALK PHOS U/L 85   EGFR ml/min/1 73sq m 72       BMP:  Results from last 7 days   Lab Units 02/23/19  0355   POTASSIUM mmol/L 4 4   CHLORIDE mmol/L 105   CO2 mmol/L 27   BUN mg/dL 16   CREATININE mg/dL 1 11   CALCIUM mg/dL 8 9          Lab Results   Component Value Date    NTBNP 3,544 (H) 02/23/2019                              Results from last 7 days   Lab Units 02/23/19  0741   INR  1 05     Lipid Profile:   No results found for: CHOL  No results found for: HDL  No results found for: LDLCALC  No results found for: TRIG      Cardiac testing:   No results found for this or any previous visit  No results found for this or any previous visit  No procedure found  No results found for this or any previous visit  Imaging: I have personally reviewed pertinent reports      Vas Lower Limb Arterial Duplex, Complete Bilateral    Result Date: 2/6/2019  Narrative:  [facility] REPORT CLINICAL: Indications: Patient presents with pain and weakness in his after walking up >1 flight of steps (Right > Left) but does not bother him while walking flat/level ground  He reports he is able to walk through the discomfort without needing to stop or sit down  Operative History: Patient denies any cardiovascular surgeries Risk Factors The patient has history of smoking (current) 1-2 ppd  FINDINGS:  Segment                Right                 Left                                          Impression  PSV  EDV  Impression  PSV  EDV  Common Femoral Artery               56    9               77    7  Prox Profunda                      105   17               75    0  Prox SFA               Occluded                          109    0  Mid SFA                Occluded                           54    0  Dist SFA                            16    4               93    5  Proximal Pop                        44   11  50-75%      224   27  Distal Pop                          23    7               75    8  Dist Post Tibial                    36   14               45    6  Dist  Ant  Tibial                   15    4               27    0     CONCLUSION:  Impression: RIGHT LOWER LIMB: High grade vs occlusion of the proximal superficial femoral artery with reconstitution in the distal segment Resting Ankle/Brachial index: 0 78 which is in the moderate claudication range  PVR tracings are essentially normal with mild dampened upstroke  PPG tracings on the great toe were diminished/dampened  Metatarsal pressure of 52mmHg Great toe pressure of 27mmHg, below the healing range LEFT LOWER LIMB: There is a 50-75% focal stenosis noted in the proximal popliteal artery  Resting Ankle/Brachial index: 1 17 which is in the normal range   PVR/ PPG tracings are normal Metatarsal pressure of 71mmHg Great toe pressure of 59mmHg, within the healing range POST EXERCISE PHYSIOLOGIC: RIGHT: Patient performed 30 slow toe raises with an immediate post exercise LOUISE of 0 48 (Ischemic Range) LEFT: Patient performed 30 slow toe raises with an immediate post exercise LOUISE 1 11 (Normal Range)  SIGNATURE: Electronically Signed by: Kaleb Guevara MD on 2019-02-06 05:32:13 PM    Vas Upper Limb Venous Duplex Scan, Unilateral/limited    Result Date: 2/6/2019  Narrative:  [facility] REPORT CLINICAL: Indications: Patient presents with swelling of his left hand and lumps on his forearm x 3-4 months  Denies any pain  Operative History: Patient denies any cardiovascular surgeries Risk Factors The patient has history of smoking (current) 1ppd  CONCLUSION:  Impression RIGHT UPPER LIMB LIMITED: Evaluation shows no evidence of thrombus in the internal jugular vein, subclavian vein, and the brachiocephalic vein  LEFT UPPER LIMB: No evidence of acute or chronic deep vein thrombosis  No evidence of superficial thrombophlebitis noted  Doppler evaluation shows a normal response to augmentation maneuvers  Tech Note: There is a non-vascular structure noted in the anterior muscle fascia of the left mid forearm of unknown etiology which may represent hematoma  SIGNATURE: Electronically Signed by: Kaleb Guevara MD on 2019-02-06 05:31:39 PM    Thank you for allowing us to participate in this patient's care  Counseling / Coordination of Care  Total floor / unit time spent today 45 minutes  Greater than 50% of total time was spent with the patient and / or family counseling and / or coordination of care  A description of the counseling / coordination of care: Review of history, current assessment, development of a plan  Code Status: Level 1 - Full Code    ** Please Note: Dragon 360 Dictation voice to text software may have been used in the creation of this document   **

## 2019-02-23 NOTE — ED NOTES
Charge RN called for Telemetry box - none available at this time in the hospital  Patient care delayed at this time   Patient made aware of situation     Max Rivera RN  02/23/19 3101

## 2019-02-23 NOTE — ASSESSMENT & PLAN NOTE
· Trending upwards (0 11 -> 0 40 -> 1 51 -> 5 56)  · Started on heparin drip  · Loaded with Plavix  · Started on ASA, beta-blocker and statin  · Cardiology following and appreciate their input  · Cardiac cath planned for Monday  Cardiology suspects multivessel disease

## 2019-02-23 NOTE — ASSESSMENT & PLAN NOTE
Patient presented with dyspnea on exertion orthopnea and significantly elevated proBNP  Most likely new onset of heart failure, will rule out ACS given elevated troponin  Admitted to telemetry, serial cardiac enzymes  Aspirin loading dose given in the ER  Will continue daily aspirin, statin  Lasix 40 mg IV b i d    Intake output daily weight  Heart healthy diet  Check fasting lipid panel ,hemoglobin A1c  Obtain echo to assess ejection fraction , structural or wall motion abnormality  Cardiology consult

## 2019-02-23 NOTE — H&P
H&P- Claudia Perez 1960, 59 y o  male MRN: [id number]    Unit/Bed#: ED 01 Encounter: [id number]    Primary Care Provider: Cynthia Howard DO   Date and time admitted to hospital: 2/23/2019  3:45 AM        * Dyspnea on exertion  Assessment & Plan  Patient presented with dyspnea on exertion orthopnea and significantly elevated proBNP  Most likely new onset of heart failure, will rule out ACS given elevated troponin  Admitted to telemetry, serial cardiac enzymes  Aspirin loading dose given in the ER  Will continue daily aspirin, statin  Lasix 40 mg IV b i d  Intake output daily weight  Heart healthy diet  Check fasting lipid panel ,hemoglobin A1c  Obtain echo to assess ejection fraction , structural or wall motion abnormality  Cardiology consult    Elevated troponin  Assessment & Plan  Management as above    Dyslipidemia  Assessment & Plan  Low-cholesterol diet  Check fasting lipid panel    Tobacco abuse disorder  Assessment & Plan  Nicotine patch ordered    Peripheral vascular disease [facility])  Assessment & Plan  Patient follows with vascular surgeon  Continue aspirin ,statin increased to 40 mg daily    Elevated random blood glucose level  Assessment & Plan  Further management pending hemoglobin A1c level      VTE Prophylaxis: Enoxaparin (Lovenox)  / sequential compression device   Code Status: full  POLST: There is no POLST form on file for this patient (pre-hospital)  Discussion with family:  No family members at bedside    Anticipated Length of Stay:  Patient will be admitted on an Inpatient basis with an anticipated length of stay of  > 2 midnights  Justification for Hospital Stay:  ACS CHF workup, cardiology consult    Total Time for Visit, including Counseling / Coordination of Care: 45 minutes  Greater than 50% of this total time spent on direct patient counseling and coordination of care      Chief Complaint:   Shortness of breath    History of Present Illness:    Claudia Perez is a 59 y o  male with history of PVD, smoker 1 PPD who presented from home for evaluation of progressively getting worse shortness of breath with activities, orthopnea over the past 4 days  Patient denies getting worse leg edema fever chills productive cough  He follows with vascular surgeon for his PVD and  "blockage in the  right leg"  Workup in the ER reveal elevated troponin of 0 11 ,proBNP 3544  EKG showed ST depression in V5 through 6, chest x-ray remarkable for significant vascular congestion( official report pending)  Patient was given 20 mg of IV Lasix in the ER  He admitted to the hospitalist service on an  inpatient basis fall ACS and  HF workup    Review of Systems:    Review of Systems   Respiratory: Positive for shortness of breath  Past Medical and Surgical History:     History reviewed  No pertinent past medical history  Past Surgical History:   Procedure Laterality Date    HERNIA REPAIR      NECK SURGERY  11/2016       Meds/Allergies:    Prior to Admission medications    Medication Sig Start Date End Date Taking? Authorizing Provider   aspirin (ECOTRIN LOW STRENGTH) 81 mg EC tablet Take 1 tablet (81 mg total) by mouth daily 2/11/19  Yes Kelechi Hartman MD   atorvastatin (LIPITOR) 20 mg tablet Take 1 tablet (20 mg total) by mouth daily 2/11/19  Yes Kelechi Hartman MD   cyclobenzaprine (FLEXERIL) 10 mg tablet TAKE 1 TABLET (10 MG TOTAL) BY MOUTH 2 (TWO) TIMES A DAY AS NEEDED FOR MUSCLE SPASMS   7/12/17  Yes Historical Provider, MD   diazepam (VALIUM) 5 mg tablet TAKE 1 TABLET EVERY 8 HOURS AS NEEDED MUSCLE SPASM 12/18/17  Yes Historical Provider, MD   meloxicam (MOBIC) 7 5 mg tablet Take 7 5 mg by mouth   Yes Historical Provider, MD   oxyCODONE-acetaminophen (PERCOCET) 5-325 mg per tablet Take 1 tablet by mouth daily as needed 12/17/18  Yes Historical Provider, MD   sildenafil (REVATIO) 20 mg tablet 3 TO 5 PILLS ORALLY X SINGLE DOSE DAILY PRN 6/1/17  Yes Historical Provider, MD   naproxen (NAPROSYN) 500 mg tablet Take 500 mg by mouth 2/19/18 2/19/19  Historical Provider, MD     I have reviewed home medications with a medical source (PCP, Pharmacy, other)  Allergies: Allergies   Allergen Reactions    Penicillins        Social History:     Marital Status: Single   Occupation:  Noncontributory  Patient Pre-hospital Living Situation: home  Patient Pre-hospital Level of Mobility: home  Patient Pre-hospital Diet Restrictions:  Regular  Substance Use History:   Social History     Substance and Sexual Activity   Alcohol Use Yes    Comment: social     Social History     Tobacco Use   Smoking Status Current Every Day Smoker    Packs/day: 0 50    Types: Cigarettes   Smokeless Tobacco Never Used     Social History     Substance and Sexual Activity   Drug Use Not Currently       Family History:    non-contributory    Physical Exam:     Vitals:   Blood Pressure: 121/95 (02/23/19 0645)  Pulse: 90 (02/23/19 0645)  Temperature: 98 8 °F (37 1 °C) (02/23/19 0349)  Temp Source: Oral (02/23/19 0349)  Respirations: (!) 24 (02/23/19 0645)  Weight - Scale: 79 4 kg (175 lb) (02/23/19 0349)  SpO2: 99 % (02/23/19 0645)    Physical Exam   Constitutional: No distress  HENT:   Head: Normocephalic  Eyes: Pupils are equal, round, and reactive to light  Neck: Normal range of motion  Pulmonary/Chest: No respiratory distress  Abdominal: Soft  He exhibits no distension  Musculoskeletal: He exhibits no edema  Neurological: He is alert  No cranial nerve deficit  Skin: Skin is warm  Psychiatric: He has a normal mood and affect  Additional Data:     Lab Results: I have personally reviewed pertinent reports        Results from last 7 days   Lab Units 02/23/19  0354   WBC Thousand/uL 10 33*   HEMOGLOBIN g/dL 15 4   HEMATOCRIT % 48 2   PLATELETS Thousands/uL 430*   NEUTROS PCT % 59   LYMPHS PCT % 28   MONOS PCT % 8   EOS PCT % 4     Results from last 7 days   Lab Units 02/23/19  0355   SODIUM mmol/L 139   POTASSIUM mmol/L 4 4 CHLORIDE mmol/L 105   CO2 mmol/L 27   BUN mg/dL 16   CREATININE mg/dL 1 11   ANION GAP mmol/L 7   CALCIUM mg/dL 8 9   ALBUMIN g/dL 3 5   TOTAL BILIRUBIN mg/dL 0 24   ALK PHOS U/L 85   ALT U/L 25   AST U/L 11   GLUCOSE RANDOM mg/dL 179*                       Imaging: I have personally reviewed pertinent reports  XR chest 2 views   ED Interpretation by Larissa Anna MD (02/23 0532)   Cardiomegaly, increased interstitial edema, cephalization consistent with congestive heart failure  EKG, Pathology, and Other Studies Reviewed on Admission:   · EKG:  Sinus rhythm, ST depression in V5 V6    Allscripts / Epic Records Reviewed: Yes     ** Please Note: This note has been constructed using a voice recognition system   **

## 2019-02-23 NOTE — ED NOTES
Spoke with Jone Lincoln from [facility] regarding Troponin- no changes in interventions at this time       Lorenzo Ireland RN  02/23/19 2097

## 2019-02-23 NOTE — ED ATTENDING ATTESTATION
Lupe Foley MD, saw and evaluated the patient  I have discussed the patient with the resident/non-physician practitioner and agree with the resident's/non-physician practitioner's findings, Plan of Care, and MDM as documented in the resident's/non-physician practitioner's note, except where noted  All available labs and Radiology studies were reviewed  At this point I agree with the current assessment done in the Emergency Department  I have conducted an independent evaluation of this patient including a focused history and a physical exam     A 22-year-old male, presenting to the emergency department for evaluation of shortness of breath  Patient states that he was attempting to go to bed tonight, and had increased shortness of breath with lying down flat  Additionally patient reports that he has had some left arm discomfort associated with this  Patient denies any diaphoresis, chest pain, cough  No fever  No abdominal pain, nausea, vomiting, diarrhea  No new lower extremity pain or swelling  Patient had outpatient ultrasound of the bilateral lower extremities and bilateral upper extremities for DVT performed at the beginning of February which were both negative  No personal or family history of DVT or PE  Patient has a history of peripheral arterial disease  Ten systems reviewed negative except as noted in the history of present illness  The patient is resting comfortably on a stretcher in no acute respiratory distress  The patient appears nontoxic  HEENT reveals moist mucous membranes  Head is normocephalic and atraumatic  Conjunctiva and sclera are normal  Neck is nontender and supple with full range of motion to flexion, extension, lateral rotation  No meningismus appreciated  No masses are appreciated  Lungs are clear to auscultation bilaterally without any wheezes, rales or rhonchi  Heart is regular rate and rhythm without any murmurs, rubs or gallops   Abdomen is soft and nontender without any rebound or guarding  Extremities appear grossly normal without any significant arthropathy  The patient has 2 nodular areas in his left forearm that appear to be mobile and superficial to the flexor tendon sheath  Patient is awake, alert, and oriented x3  The patient has normal interaction  Motor is 5 out of 5          Labs Reviewed   CBC AND DIFFERENTIAL - Abnormal       Result Value Ref Range Status    WBC 10 33 (*) 4 31 - 10 16 Thousand/uL Final    RBC 5 18  3 88 - 5 62 Million/uL Final    Hemoglobin 15 4  12 0 - 17 0 g/dL Final    Hematocrit 48 2  36 5 - 49 3 % Final    MCV 93  82 - 98 fL Final    MCH 29 7  26 8 - 34 3 pg Final    MCHC 32 0  31 4 - 37 4 g/dL Final    RDW 12 5  11 6 - 15 1 % Final    MPV 8 8 (*) 8 9 - 12 7 fL Final    Platelets 688 (*) 633 - 390 Thousands/uL Final    nRBC 0  /100 WBCs Final    Neutrophils Relative 59  43 - 75 % Final    Immat GRANS % 0  0 - 2 % Final    Lymphocytes Relative 28  14 - 44 % Final    Monocytes Relative 8  4 - 12 % Final    Eosinophils Relative 4  0 - 6 % Final    Basophils Relative 1  0 - 1 % Final    Neutrophils Absolute 6 08  1 85 - 7 62 Thousands/µL Final    Immature Grans Absolute 0 04  0 00 - 0 20 Thousand/uL Final    Lymphocytes Absolute 2 92  0 60 - 4 47 Thousands/µL Final    Monocytes Absolute 0 77  0 17 - 1 22 Thousand/µL Final    Eosinophils Absolute 0 44  0 00 - 0 61 Thousand/µL Final    Basophils Absolute 0 08  0 00 - 0 10 Thousands/µL Final   COMPREHENSIVE METABOLIC PANEL - Abnormal    Sodium 139  136 - 145 mmol/L Final    Potassium 4 4  3 5 - 5 3 mmol/L Final    Chloride 105  100 - 108 mmol/L Final    CO2 27  21 - 32 mmol/L Final    ANION GAP 7  4 - 13 mmol/L Final    BUN 16  5 - 25 mg/dL Final    Creatinine 1 11  0 60 - 1 30 mg/dL Final    Comment: Standardized to IDMS reference method    Glucose 179 (*) 65 - 140 mg/dL Final    Comment:   If the patient is fasting, the ADA then defines impaired fasting glucose as > 100 mg/dL and diabetes as > or equal to 123 mg/dL  Specimen collection should occur prior to Sulfasalazine administration due to the potential for falsely depressed results  Specimen collection should occur prior to Sulfapyridine administration due to the potential for falsely elevated results  Calcium 8 9  8 3 - 10 1 mg/dL Final    AST 11  5 - 45 U/L Final    Comment:   Specimen collection should occur prior to Sulfasalazine administration due to the potential for falsely depressed results  ALT 25  12 - 78 U/L Final    Comment:   Specimen collection should occur prior to Sulfasalazine and/or Sulfapyridine administration due to the potential for falsely depressed results  Alkaline Phosphatase 85  46 - 116 U/L Final    Total Protein 7 7  6 4 - 8 2 g/dL Final    Albumin 3 5  3 5 - 5 0 g/dL Final    Total Bilirubin 0 24  0 20 - 1 00 mg/dL Final    eGFR 72  ml/min/1 73sq m Final    Narrative:     National Kidney Disease Education Program recommendations are as follows:  GFR calculation is accurate only with a steady state creatinine  Chronic Kidney disease less than 60 ml/min/1 73 sq  meters  Kidney failure less than 15 ml/min/1 73 sq  meters  TROPONIN I - Abnormal    Troponin I 0 11 (*) <=0 04 ng/mL Final    Comment:   [employer] Chemistry analyzer 99% cutoff is > 0 04 ng/mL in network labs     o cTnI 99% cutoff is useful only when applied to patients in the clinical setting of myocardial ischemia   o cTnI 99% cutoff should be interpreted in the context of clinical history, ECG findings and possibly cardiac imaging to establish correct diagnosis  o cTnI 99% cutoff may be suggestive but clearly not indicative of a coronary event without the clinical setting of myocardial ischemia      Results indicate test should be repeated on new specimen collected within 4-6 hours of the original   D-DIMER, QUANTITATIVE - Abnormal    D-Dimer, Quant 508 (*) <500 ng/ml (FEU) Final    Comment:   Reference and upper limits to exclude DVT and PE are the same  Do not use to exclude if clinical symptoms are present  NT-BNP PRO (BRAIN NATRIURETIC PEPTIDE) - Abnormal    NT-proBNP 3,544 (*) <125 pg/mL Final   TROPONIN I       XR chest 2 views   ED Interpretation   Cardiomegaly, increased interstitial edema, cephalization consistent with congestive heart failure  Bedside ultrasound was utilized to evaluate the heart in a parasternal long axis, parasternal short axis, and apical 4 chamber views  The patient has a dilated cardiomyopathy with an ejection fraction which is less than 30%  He is noted to have some mitral valve regurgitation  Plan is to admit the patient for further cardiac evaluation including echocardiography with Cardiology

## 2019-02-23 NOTE — ED PROVIDER NOTES
History  Chief Complaint   Patient presents with    Shortness of Breath     pt reports SOB X4 days  States he has "calcifications of the lung"  States he was lying in bed and dyspnic at rest  Presents for evaluation     33-year-old male history of claudication him lower extremities, hyperlipidemia and 40 pack year history presenting to the emergency department with acute onset shortness of breath that began abruptly when he laid down to sleep this evening  Patient states while lying there he became extremely short of breath requiring himself to get up multiple times throughout the night which improved his symptoms  He states it is not exertional in nature is not pleuritic in nature he does not have any associated chest pain or pressure but he does have pain in his left arm is bicep radiating down to his wrist   He does have chronic radicular pain in his left upper extremity due to a cervical surgery, but this is different  He denies any associated nausea or vomiting no diaphoresis no recent constipation diarrhea fevers or chills denies any associated cough or upper respiratory symptoms  He does not have any history of cardiac disease he states his mother had a myocardial infarction in her 62s  Remaining ROS negative  History provided by:  Patient   used: No    Shortness of Breath   Severity:  Moderate  Onset quality:  Sudden  Timing:  Constant  Progression:  Unchanged  Chronicity:  New  Relieved by:  Nothing  Worsened by:  Nothing  Ineffective treatments:  None tried  Associated symptoms: no abdominal pain, no chest pain, no fever, no headaches, no rash, no sore throat and no vomiting    Risk factors: no hx of PE/DVT and no recent surgery        Prior to Admission Medications   Prescriptions Last Dose Informant Patient Reported?  Taking?   aspirin (ECOTRIN LOW STRENGTH) 81 mg EC tablet 2/22/2019 at Unknown time  No Yes   Sig: Take 1 tablet (81 mg total) by mouth daily   atorvastatin (LIPITOR) 20 mg tablet 2/22/2019 at Unknown time  No Yes   Sig: Take 1 tablet (20 mg total) by mouth daily   cyclobenzaprine (FLEXERIL) 10 mg tablet 2/22/2019 at Unknown time Self Yes Yes   Sig: TAKE 1 TABLET (10 MG TOTAL) BY MOUTH 2 (TWO) TIMES A DAY AS NEEDED FOR MUSCLE SPASMS    diazepam (VALIUM) 5 mg tablet 2/22/2019 at Unknown time Self Yes Yes   Sig: TAKE 1 TABLET EVERY 8 HOURS AS NEEDED MUSCLE SPASM   meloxicam (MOBIC) 7 5 mg tablet 2/22/2019 at Unknown time Self Yes Yes   Sig: Take 7 5 mg by mouth   naproxen (NAPROSYN) 500 mg tablet  Self Yes No   Sig: Take 500 mg by mouth   oxyCODONE-acetaminophen (PERCOCET) 5-325 mg per tablet 2/22/2019 at Unknown time Self Yes Yes   Sig: Take 1 tablet by mouth daily as needed   sildenafil (REVATIO) 20 mg tablet 2/22/2019 at Unknown time Self Yes Yes   Sig: 3 TO 5 PILLS ORALLY X SINGLE DOSE DAILY PRN      Facility-Administered Medications: None       History reviewed  No pertinent past medical history  Past Surgical History:   Procedure Laterality Date    HERNIA REPAIR      NECK SURGERY  11/2016       Family History   Problem Relation Age of Onset    Diabetes Mother     Cancer Father      I have reviewed and agree with the history as documented  Social History     Tobacco Use    Smoking status: Current Every Day Smoker     Packs/day: 0 50     Types: Cigarettes    Smokeless tobacco: Never Used   Substance Use Topics    Alcohol use: Yes     Comment: social    Drug use: Not Currently        Review of Systems   Constitutional: Negative for chills, fatigue and fever  HENT: Negative for sore throat  Eyes: Negative for visual disturbance  Respiratory: Positive for shortness of breath  Cardiovascular: Negative for chest pain  Gastrointestinal: Negative for abdominal pain, constipation, diarrhea, nausea and vomiting  Genitourinary: Negative for difficulty urinating, dysuria and hematuria  Musculoskeletal: Negative for arthralgias          L arm pain Skin: Negative for rash  Neurological: Negative for syncope, weakness and headaches  Hematological: Negative for adenopathy  Psychiatric/Behavioral: Negative for agitation and behavioral problems  All other systems reviewed and are negative  Physical Exam  ED Triage Vitals [02/23/19 0349]   Temperature Pulse Respirations Blood Pressure SpO2   98 8 °F (37 1 °C) 85 (!) 24 146/88 99 %      Temp Source Heart Rate Source Patient Position - Orthostatic VS BP Location FiO2 (%)   Oral Monitor Lying Left arm --      Pain Score       --           Orthostatic Vital Signs  Vitals:    02/23/19 0349 02/23/19 0400 02/23/19 0430 02/23/19 0533   BP: 146/88 134/96 147/99 136/95   Pulse: 85 86 86 82   Patient Position - Orthostatic VS: Lying   Lying       Physical Exam   Constitutional: He is oriented to person, place, and time  He appears well-developed and well-nourished  HENT:   Head: Normocephalic and atraumatic  Eyes: Conjunctivae and EOM are normal  No scleral icterus  Neck: Normal range of motion  Neck supple  Cardiovascular: Normal rate, regular rhythm, normal heart sounds and intact distal pulses  No murmur heard  Pulmonary/Chest: Effort normal and breath sounds normal  He has no decreased breath sounds  He has no wheezes  Abdominal: Soft  Bowel sounds are normal  There is no tenderness  Musculoskeletal: Normal range of motion  Neurological: He is alert and oriented to person, place, and time  Skin: Skin is warm and dry  Psychiatric: He has a normal mood and affect  His behavior is normal    Nursing note and vitals reviewed        ED Medications  Medications   furosemide (LASIX) injection 20 mg (has no administration in time range)   furosemide (LASIX) injection 40 mg (has no administration in time range)   aspirin chewable tablet 324 mg (324 mg Oral Given 2/23/19 0957)       Diagnostic Studies  Results Reviewed     Procedure Component Value Units Date/Time    Troponin I [[id number]]     Lab Status:  No result Specimen:  Blood     Troponin I [[id number]] Collected:  02/23/19 0617    Lab Status:  No result Specimen:  Blood from Arm, Left     NT-BNP PRO (BNP for [place], [place], [place], [place], [place], [facility], [facility], [place] campuses) [[id number]]  (Abnormal) Collected:  02/23/19 0355    Lab Status:  Final result Specimen:  Blood from Arm, Right Updated:  02/23/19 0530     NT-proBNP 3,544 pg/mL     D-dimer, quantitative [[id number]]  (Abnormal) Collected:  02/23/19 0420    Lab Status:  Final result Specimen:  Blood from Arm, Right Updated:  02/23/19 0512     D-Dimer, Quant 508 ng/ml (FEU)     Troponin I [[id number]]  (Abnormal) Collected:  02/23/19 0354    Lab Status:  Final result Specimen:  Blood from Arm, Right Updated:  02/23/19 0422     Troponin I 0 11 ng/mL     Comprehensive metabolic panel [[id number]]  (Abnormal) Collected:  02/23/19 0355    Lab Status:  Final result Specimen:  Blood from Arm, Right Updated:  02/23/19 0421     Sodium 139 mmol/L      Potassium 4 4 mmol/L      Chloride 105 mmol/L      CO2 27 mmol/L      ANION GAP 7 mmol/L      BUN 16 mg/dL      Creatinine 1 11 mg/dL      Glucose 179 mg/dL      Calcium 8 9 mg/dL      AST 11 U/L      ALT 25 U/L      Alkaline Phosphatase 85 U/L      Total Protein 7 7 g/dL      Albumin 3 5 g/dL      Total Bilirubin 0 24 mg/dL      eGFR 72 ml/min/1 73sq m     Narrative:       National Kidney Disease Education Program recommendations are as follows:  GFR calculation is accurate only with a steady state creatinine  Chronic Kidney disease less than 60 ml/min/1 73 sq  meters  Kidney failure less than 15 ml/min/1 73 sq  meters      CBC and differential [[id number]]  (Abnormal) Collected:  02/23/19 0354    Lab Status:  Final result Specimen:  Blood from Arm, Right Updated:  02/23/19 0404     WBC 10 33 Thousand/uL      RBC 5 18 Million/uL      Hemoglobin 15 4 g/dL      Hematocrit 48 2 %      MCV 93 fL      MCH 29 7 pg      MCHC 32 0 g/dL      RDW 12 5 %      MPV 8 8 fL      Platelets 171 Thousands/uL nRBC 0 /100 WBCs      Neutrophils Relative 59 %      Immat GRANS % 0 %      Lymphocytes Relative 28 %      Monocytes Relative 8 %      Eosinophils Relative 4 %      Basophils Relative 1 %      Neutrophils Absolute 6 08 Thousands/µL      Immature Grans Absolute 0 04 Thousand/uL      Lymphocytes Absolute 2 92 Thousands/µL      Monocytes Absolute 0 77 Thousand/µL      Eosinophils Absolute 0 44 Thousand/µL      Basophils Absolute 0 08 Thousands/µL                  XR chest 2 views   ED Interpretation by Jose E Leyva MD (02/23 0532)   Cardiomegaly, increased interstitial edema, cephalization consistent with congestive heart failure  Procedures  ECG 12 Lead Documentation  Date/Time: 2/23/2019 5:35 AM  Performed by: Bakari Saavedra MD  Authorized by: Bakari Saavedra MD     ECG reviewed by me, the ED Provider: yes    Patient location:  ED  Previous ECG:     Previous ECG:  Compared to current    Similarity:  Changes noted    Comparison to cardiac monitor: Yes    Interpretation:     Interpretation: abnormal    Rate:     ECG rate assessment: normal    Rhythm:     Rhythm: sinus rhythm    Ectopy:     Ectopy: none    QRS:     QRS axis:  Left  Conduction:     Conduction: normal    ST segments:     ST segments:  Abnormal    Depression:  V6 and V5  T waves:     T waves: non-specific and inverted      Inverted:  AVL          Phone Consults  ED Phone Contact    ED Course  ED Course as of Feb 23 0628   Sat Feb 23, 2019   0515 Age adjusted , within normal limits, less than 600   D-DIMER QUANTITATIVE(!): 508   0533 Consistent with bedside echocardiogram which showed reduced ejection fraction     NT-proBNP(!): 3,544             MDM  Number of Diagnoses or Management Options  Acute respiratory distress: new and requires workup  CHF (congestive heart failure) ([facility] [address] ): new and requires workup  EKG abnormalities: new and requires workup  Elevated troponin: new and requires workup  Diagnosis management comments: 20-year-old male presenting with acute onset shortness of breath when going to bed  Will obtain cardiac workup and order a D-dimer to evaluate for PE  Patient found to have an elevated troponin of 0 11, a just a D-dimer was within normal limits, bedside echocardiogram showed severely decreased systolic ejection fracture concerning for congestive heart failure  Patient feels mildly improved while waiting in the room, chest x-ray also showed interstitial thickening concerning for fluid overload  Amount and/or Complexity of Data Reviewed  Clinical lab tests: ordered and reviewed  Tests in the radiology section of CPT®: ordered and reviewed  Tests in the medicine section of CPT®: ordered and reviewed  Review and summarize past medical records: yes  Discuss the patient with other providers: yes  Independent visualization of images, tracings, or specimens: yes        Disposition  Final diagnoses:   Acute respiratory distress   CHF (congestive heart failure) (HCC)   Elevated troponin   EKG abnormalities     Time reflects when diagnosis was documented in both MDM as applicable and the Disposition within this note     Time User Action Codes Description Comment    2/23/2019  5:32 AM Merilee Primas Add [R06 03] Acute respiratory distress     2/23/2019  5:33 AM Merilee Primas Add [I50 9] CHF (congestive heart failure) ([facility] [address] )     2/23/2019  5:33 AM Merilee Primas Add [R74 8] Elevated troponin     2/23/2019  5:33 AM Merilee Primas Add [R94 31] EKG abnormalities     2/23/2019  5:33 AM Merilee Primas Modify [R06 03] Acute respiratory distress     2/23/2019  5:33 AM Merilee Primas Modify [I50 9] CHF (congestive heart failure) [facility])       ED Disposition     ED Disposition Condition Date/Time Comment    Admit Stable Sat Feb 23, 2019  5:32 AM Case was discussed with JAMA and the patient's admission status was agreed to be Admission Status: inpatient status to the service of Dr Martina Lozoya   Follow-up Information    None         Patient's Medications   Discharge Prescriptions    No medications on file     No discharge procedures on file  ED Provider  Attending physically available and evaluated Kathrine Cluster  I managed the patient along with the ED Attending      Electronically Signed by         Rajwinder Solano MD  02/23/19 9913

## 2019-02-23 NOTE — PROGRESS NOTES
Post-admit Progress Note Fabby Cordova 1960, 59 y o  male MRN: [id number]  Unit/Bed#: [facility] 427-01 Encounter: [id number]  Primary Care Provider: Tessa Jolly DO   Date and time admitted to hospital: 2/23/2019  3:45 AM    This is a post admit check  Patient's troponins have been trending upward  He currently is being maintained on heparin drip and has been loaded with Plavix  He is asymptomatic at this time and denies any chest pain or shortness of breath  He denies any nausea or vomiting  * Dyspnea on exertion  Assessment & Plan  · Patient presented with dyspnea on exertion orthopnea and significantly elevated proBNP  · Most likely new onset of heart failure, will rule out ACS given elevated troponin  · Cardiac enzymes are trending up  · Started on heparin drip  · Aspirin loading dose given in the ER  · Will continue daily aspirin, statin  · Lasix 40 mg IV b i d   · Intake output daily weight  · Heart healthy diet  · Check fasting lipid panel ,hemoglobin A1c  · Obtain echo to assess ejection fraction , structural or wall motion abnormality  · Cardiology consult pending  NSTEMI (non-ST elevated myocardial infarction) (HCC)  Assessment & Plan  · Trending upwards (0 11 -> 0 40 -> 1 51 -> 5 56)  · Started on heparin drip  · Loaded with Plavix  · Started on ASA, beta-blocker and statin  · Cardiology following and appreciate their input  · Cardiac cath planned for Monday  Cardiology suspects multivessel disease  Elevated random blood glucose level  Assessment & Plan  · Check A1c   · No prior history of diabetes  Peripheral vascular disease [facility])  Assessment & Plan  · Patient follows with vascular surgeon  · Continue aspirin  · Statin increased to 40 mg daily  Tobacco abuse disorder  Assessment & Plan  · Encourage cessation  · Continue Nicotine patch

## 2019-02-23 NOTE — ASSESSMENT & PLAN NOTE
· Patient presented with dyspnea on exertion orthopnea and significantly elevated proBNP  · Most likely new onset of heart failure, will rule out ACS given elevated troponin  · Cardiac enzymes are trending up  · Started on heparin drip  · Aspirin loading dose given in the ER  · Will continue daily aspirin, statin  · Lasix 40 mg IV b i d   · Intake output daily weight  · Heart healthy diet  · Check fasting lipid panel ,hemoglobin A1c  · Obtain echo to assess ejection fraction , structural or wall motion abnormality  · Cardiology consult pending

## 2019-02-23 NOTE — ED NOTES
Aiden texted [facility] admitting and made aware of patient's Troponin result       Sanchez Leiva RN  02/23/19 6996

## 2019-02-23 NOTE — PROGRESS NOTES
Rounded with Dr Magda Holliday  Will keep trending trops until they peak  Will load with plavix  Cath Monday  Continue hep gtt

## 2019-02-24 ENCOUNTER — APPOINTMENT (INPATIENT)
Dept: NON INVASIVE DIAGNOSTICS | Facility: HOSPITAL | Age: 59
DRG: 281 | End: 2019-02-24
Payer: MEDICARE

## 2019-02-24 LAB
ANION GAP SERPL CALCULATED.3IONS-SCNC: 8 MMOL/L (ref 4–13)
APTT PPP: 58 SECONDS (ref 26–38)
APTT PPP: 60 SECONDS (ref 26–38)
APTT PPP: 70 SECONDS (ref 26–38)
BUN SERPL-MCNC: 20 MG/DL (ref 5–25)
CALCIUM SERPL-MCNC: 9.1 MG/DL (ref 8.3–10.1)
CHLORIDE SERPL-SCNC: 104 MMOL/L (ref 100–108)
CHOLEST SERPL-MCNC: 133 MG/DL (ref 50–200)
CO2 SERPL-SCNC: 26 MMOL/L (ref 21–32)
CREAT SERPL-MCNC: 1.15 MG/DL (ref 0.6–1.3)
EST. AVERAGE GLUCOSE BLD GHB EST-MCNC: 137 MG/DL
GFR SERPL CREATININE-BSD FRML MDRD: 69 ML/MIN/1.73SQ M
GLUCOSE SERPL-MCNC: 113 MG/DL (ref 65–140)
HBA1C MFR BLD: 6.4 % (ref 4.2–6.3)
HDLC SERPL-MCNC: 47 MG/DL (ref 40–60)
LDLC SERPL CALC-MCNC: 68 MG/DL (ref 0–100)
MAGNESIUM SERPL-MCNC: 2.1 MG/DL (ref 1.6–2.6)
NONHDLC SERPL-MCNC: 86 MG/DL
POTASSIUM SERPL-SCNC: 4.1 MMOL/L (ref 3.5–5.3)
SODIUM SERPL-SCNC: 138 MMOL/L (ref 136–145)
TRIGL SERPL-MCNC: 92 MG/DL
TROPONIN I SERPL-MCNC: 10.4 NG/ML
TROPONIN I SERPL-MCNC: 8.39 NG/ML

## 2019-02-24 PROCEDURE — 93306 TTE W/DOPPLER COMPLETE: CPT

## 2019-02-24 PROCEDURE — 80061 LIPID PANEL: CPT | Performed by: HOSPITALIST

## 2019-02-24 PROCEDURE — 83735 ASSAY OF MAGNESIUM: CPT | Performed by: HOSPITALIST

## 2019-02-24 PROCEDURE — 85730 THROMBOPLASTIN TIME PARTIAL: CPT | Performed by: INTERNAL MEDICINE

## 2019-02-24 PROCEDURE — 99232 SBSQ HOSP IP/OBS MODERATE 35: CPT | Performed by: NURSE PRACTITIONER

## 2019-02-24 PROCEDURE — 83036 HEMOGLOBIN GLYCOSYLATED A1C: CPT | Performed by: HOSPITALIST

## 2019-02-24 PROCEDURE — 84484 ASSAY OF TROPONIN QUANT: CPT | Performed by: HOSPITALIST

## 2019-02-24 PROCEDURE — 99232 SBSQ HOSP IP/OBS MODERATE 35: CPT | Performed by: INTERNAL MEDICINE

## 2019-02-24 PROCEDURE — 80048 BASIC METABOLIC PNL TOTAL CA: CPT | Performed by: HOSPITALIST

## 2019-02-24 PROCEDURE — 85730 THROMBOPLASTIN TIME PARTIAL: CPT | Performed by: HOSPITALIST

## 2019-02-24 PROCEDURE — 93306 TTE W/DOPPLER COMPLETE: CPT | Performed by: INTERNAL MEDICINE

## 2019-02-24 RX ADMIN — DOCUSATE SODIUM 100 MG: 100 CAPSULE, LIQUID FILLED ORAL at 09:50

## 2019-02-24 RX ADMIN — HEPARIN SODIUM AND DEXTROSE 20 UNITS/KG/HR: 10000; 5 INJECTION INTRAVENOUS at 18:15

## 2019-02-24 RX ADMIN — DOCUSATE SODIUM 100 MG: 100 CAPSULE, LIQUID FILLED ORAL at 17:31

## 2019-02-24 RX ADMIN — ATORVASTATIN CALCIUM 40 MG: 40 TABLET, FILM COATED ORAL at 17:31

## 2019-02-24 RX ADMIN — FUROSEMIDE 20 MG: 10 INJECTION, SOLUTION INTRAVENOUS at 09:50

## 2019-02-24 RX ADMIN — ASPIRIN 81 MG: 81 TABLET, COATED ORAL at 09:50

## 2019-02-24 RX ADMIN — METOPROLOL TARTRATE 25 MG: 25 TABLET, FILM COATED ORAL at 09:50

## 2019-02-24 RX ADMIN — CLOPIDOGREL BISULFATE 75 MG: 75 TABLET ORAL at 09:50

## 2019-02-24 RX ADMIN — METOPROLOL TARTRATE 25 MG: 25 TABLET, FILM COATED ORAL at 20:40

## 2019-02-24 RX ADMIN — MELOXICAM 7.5 MG: 7.5 TABLET ORAL at 09:50

## 2019-02-24 RX ADMIN — HEPARIN SODIUM AND DEXTROSE 18 UNITS/KG/HR: 10000; 5 INJECTION INTRAVENOUS at 04:12

## 2019-02-24 RX ADMIN — STANDARDIZED SENNA CONCENTRATE 8.6 MG: 8.6 TABLET ORAL at 09:50

## 2019-02-24 NOTE — PROGRESS NOTES
Cardiology Progress Note - Ivan Rey 59 y o  male MRN: [id number]    Unit/Bed#: [facility] 427-01 Encounter: [id number]    Assessment and plan  1  Non STEMI type 1  2  Peripheral arterial disease  3  Hypertension  4  Hyperlipidemia  5  Tobacco abuse     Recommendations:  Overall he has been doing better  He has had no further chest discomfort or dyspnea  Troponins peaked  Plan left heart catheterization tomorrow to define coronary anatomy  Continue aspirin, Plavix, IV heparin, beta-blocker, statin  Subjective:    No significant events overnight  Feels well chest pain and shortness of breath have resolved  No lightheadedness dizziness or syncope no events on telemetry  ROS    Objective:   Vitals: Blood pressure 119/77, pulse 83, temperature 97 6 °F (36 4 °C), temperature source Oral, resp  rate 18, height 5' 9" (1 753 m), weight 74 8 kg (164 lb 14 5 oz), SpO2 93 %  , Body mass index is 24 35 kg/m² , Orthostatic Blood Pressures      Most Recent Value   Blood Pressure  119/77 filed at 02/24/2019 0950   Patient Position - Orthostatic VS  Sitting filed at 02/24/2019 9610         Systolic (96AXZ), SJU:986 , Min:106 , EEY:035     Diastolic (33RQK), PWQ:91, Min:73, Max:87      Intake/Output Summary (Last 24 hours) at 2/24/2019 1022  Last data filed at 2/24/2019 0715  Gross per 24 hour   Intake 2036 13 ml   Output 775 ml   Net 1261 13 ml     Weight (last 2 days)     Date/Time   Weight    02/24/19 0600   74 8 (164 9)    02/23/19 0349   79 4 (175)                Telemetry Review: No significant arrhythmias seen on telemetry review  EKG personally reviewed by Ibeth Perrin DO  Physical Exam   Constitutional: He is oriented to person, place, and time  He appears well-nourished  No distress  HENT:   Head: Atraumatic  Eyes: Pupils are equal, round, and reactive to light  Conjunctivae are normal    Neck: Neck supple  Cardiovascular: Normal rate, regular rhythm and normal heart sounds   Exam reveals no friction rub  No murmur heard  Pulmonary/Chest: Effort normal and breath sounds normal  No respiratory distress  He has no wheezes  He has no rales  Abdominal: Bowel sounds are normal  He exhibits no distension  There is no tenderness  There is no rebound  Musculoskeletal: He exhibits no edema  Neurological: He is alert and oriented to person, place, and time  No cranial nerve deficit  Skin: Skin is warm and dry  No erythema  Nursing note and vitals reviewed  Laboratory Results:  Results from last 7 days   Lab Units 02/24/19  0257 02/23/19  2331 02/23/19 2026   TROPONIN I ng/mL 8 39* 10 40* 10 20*       CBC with diff: Results from last 7 days   Lab Units 02/23/19  0354   WBC Thousand/uL 10 33*   HEMOGLOBIN g/dL 15 4   HEMATOCRIT % 48 2   MCV fL 93   PLATELETS Thousands/uL 430*   MCH pg 29 7   MCHC g/dL 32 0   RDW % 12 5   MPV fL 8 8*   NRBC AUTO /100 WBCs 0         CMP:  Results from last 7 days   Lab Units 02/24/19 0257 02/23/19  0355   POTASSIUM mmol/L 4 1 4 4   CHLORIDE mmol/L 104 105   CO2 mmol/L 26 27   BUN mg/dL 20 16   CREATININE mg/dL 1 15 1 11   CALCIUM mg/dL 9 1 8 9   AST U/L  --  11   ALT U/L  --  25   ALK PHOS U/L  --  85   EGFR ml/min/1 73sq m 69 72         BMP:  Results from last 7 days   Lab Units 02/24/19  0257 02/23/19  0355   POTASSIUM mmol/L 4 1 4 4   CHLORIDE mmol/L 104 105   CO2 mmol/L 26 27   BUN mg/dL 20 16   CREATININE mg/dL 1 15 1 11   CALCIUM mg/dL 9 1 8 9       BNP: No results for input(s): BNP in the last 72 hours      Magnesium:   Results from last 7 days   Lab Units 02/24/19  0257   MAGNESIUM mg/dL 2 1       Coags:   Results from last 7 days   Lab Units 02/24/19  0257 02/23/19 2026 02/23/19  1340 02/23/19  0741   PTT seconds 60* 45* 39* 30   INR   --   --   --  1 05       TSH:        Hemoglobin A1C   Results from last 7 days   Lab Units 02/24/19 0257   HEMOGLOBIN A1C % 6 4*       Lipid Profile: Results from last 7 days   Lab Units 02/24/19  0257   TRIGLYCERIDES mg/dL 92   HDL mg/dL 47       Cardiac testing:   No results found for this or any previous visit  No results found for this or any previous visit  No results found for this or any previous visit  No results found for this or any previous visit      Meds/Allergies   all current active meds have been reviewed  Medications Prior to Admission   Medication    aspirin (ECOTRIN LOW STRENGTH) 81 mg EC tablet    atorvastatin (LIPITOR) 20 mg tablet    cyclobenzaprine (FLEXERIL) 10 mg tablet    diazepam (VALIUM) 5 mg tablet    meloxicam (MOBIC) 7 5 mg tablet    oxyCODONE-acetaminophen (PERCOCET) 5-325 mg per tablet    sildenafil (REVATIO) 20 mg tablet    naproxen (NAPROSYN) 500 mg tablet         heparin (porcine) 3-20 Units/kg/hr (Order-Specific) Last Rate: 18 Units/kg/hr (02/24/19 0685)     Assessment:  Principal Problem:    Dyspnea on exertion  Active Problems:    Dyslipidemia    Tobacco abuse disorder    NSTEMI (non-ST elevated myocardial infarction) (HCC)    Peripheral vascular disease (HCC)    Elevated random blood glucose level

## 2019-02-24 NOTE — SOCIAL WORK
CM met pt at bedside and made aware of CM role at dc  Pt denies having a LW or POA  Primary contact is mother Cole Christie- [phone]  Pt reported that he lives alone in a [facility] style house with 2 KIKE  Pt was IPTA with all ADL's, drive and retired  Pt has no DME  Ph[facility] is [facility]  PCP is dr Gus Obregon  Pt denies hx with HHC, STR, alc, drug and IP psych tx  Pt has transportation when dc  CM reviewed d/c planning process including the following: identifying help at home, patient preference for d/c planning needs, Discharge Lounge, [facility] Meds to Bed program, availability of treatment team to discuss questions or concerns patient and/or family may have regarding understanding medications and recognizing signs and symptoms once discharged  CM also encouraged patient to follow up with all recommended appointments after discharge  Patient advised of importance for patient and family to participate in managing patients medical well being

## 2019-02-24 NOTE — PLAN OF CARE
Problem: PAIN - ADULT  Goal: Verbalizes/displays adequate comfort level or baseline comfort level  Description  Interventions:  - Encourage patient to monitor pain and request assistance  - Assess pain using appropriate pain scale  - Administer analgesics based on type and severity of pain and evaluate response  - Implement non-pharmacological measures as appropriate and evaluate response  - Consider cultural and social influences on pain and pain management  - Notify physician/advanced practitioner if interventions unsuccessful or patient reports new pain  Outcome: Progressing     Problem: INFECTION - ADULT  Goal: Absence or prevention of progression during hospitalization  Description  INTERVENTIONS:  - Assess and monitor for signs and symptoms of infection  - Monitor lab/diagnostic results  - Monitor all insertion sites, i e  indwelling lines, tubes, and drains  - Monitor endotracheal (as able) and nasal secretions for changes in amount and color  - [place] appropriate cooling/warming therapies per order  - Administer medications as ordered  - Instruct and encourage patient and family to use good hand hygiene technique  - Identify and instruct in appropriate isolation precautions for identified infection/condition  Outcome: Progressing  Goal: Absence of fever/infection during neutropenic period  Description  INTERVENTIONS:  - Monitor WBC  - Implement neutropenic guidelines  Outcome: Progressing     Problem: SAFETY ADULT  Goal: Patient will remain free of falls  Description  INTERVENTIONS:  - Assess patient frequently for physical needs  -  Identify cognitive and physical deficits and behaviors that affect risk of falls    -  [place] fall precautions as indicated by assessment   - Educate patient/family on patient safety including physical limitations  - Instruct patient to call for assistance with activity based on assessment  - Modify environment to reduce risk of injury  - Consider OT/PT consult to assist with strengthening/mobility  Outcome: Progressing  Goal: Maintain or return to baseline ADL function  Description  INTERVENTIONS:  -  Assess patient's ability to carry out ADLs; assess patient's baseline for ADL function and identify physical deficits which impact ability to perform ADLs (bathing, care of mouth/teeth, toileting, grooming, dressing, etc )  - Assess/evaluate cause of self-care deficits   - Assess range of motion  - Assess patient's mobility; develop plan if impaired  - Assess patient's need for assistive devices and provide as appropriate  - Encourage maximum independence but intervene and supervise when necessary  ¯ Involve family in performance of ADLs  ¯ Assess for home care needs following discharge   ¯ Request OT consult to assist with ADL evaluation and planning for discharge  ¯ Provide patient education as appropriate  Outcome: Progressing  Goal: Maintain or return mobility status to optimal level  Description  INTERVENTIONS:  - Assess patient's baseline mobility status (ambulation, transfers, stairs, etc )    - Identify cognitive and physical deficits and behaviors that affect mobility  - Identify mobility aids required to assist with transfers and/or ambulation (gait belt, sit-to-stand, lift, walker, cane, etc )  - [place] fall precautions as indicated by assessment  - Record patient progress and toleration of activity level on Mobility SBAR; progress patient to next Phase/Stage  - Instruct patient to call for assistance with activity based on assessment  - Request Rehabilitation consult to assist with strengthening/weightbearing, etc   Outcome: Progressing     Problem: DISCHARGE PLANNING  Goal: Discharge to home or other facility with appropriate resources  Description  INTERVENTIONS:  - Identify barriers to discharge w/patient and caregiver  - Arrange for needed discharge resources and transportation as appropriate  - Identify discharge learning needs (meds, wound care, etc )  - Arrange for interpretive services to assist at discharge as needed  - Refer to Case Management Department for coordinating discharge planning if the patient needs post-hospital services based on physician/advanced practitioner order or complex needs related to functional status, cognitive ability, or social support system  Outcome: Progressing     Problem: Knowledge Deficit  Goal: Patient/family/caregiver demonstrates understanding of disease process, treatment plan, medications, and discharge instructions  Description  Complete learning assessment and assess knowledge base    Interventions:  - Provide teaching at level of understanding  - Provide teaching via preferred learning methods  Outcome: Progressing

## 2019-02-25 ENCOUNTER — APPOINTMENT (INPATIENT)
Dept: NON INVASIVE DIAGNOSTICS | Facility: HOSPITAL | Age: 59
DRG: 281 | End: 2019-02-25
Payer: MEDICARE

## 2019-02-25 ENCOUNTER — ANESTHESIA EVENT (OUTPATIENT)
Dept: PERIOP | Facility: HOSPITAL | Age: 59
End: 2019-02-25

## 2019-02-25 LAB
ANION GAP SERPL CALCULATED.3IONS-SCNC: 8 MMOL/L (ref 4–13)
APTT PPP: 73 SECONDS (ref 26–38)
APTT PPP: 80 SECONDS (ref 26–38)
ATRIAL RATE: 79 BPM
ATRIAL RATE: 87 BPM
ATRIAL RATE: 90 BPM
BASOPHILS # BLD AUTO: 0.08 THOUSANDS/ΜL (ref 0–0.1)
BASOPHILS NFR BLD AUTO: 1 % (ref 0–1)
BUN SERPL-MCNC: 21 MG/DL (ref 5–25)
CALCIUM SERPL-MCNC: 9.2 MG/DL (ref 8.3–10.1)
CHLORIDE SERPL-SCNC: 105 MMOL/L (ref 100–108)
CO2 SERPL-SCNC: 24 MMOL/L (ref 21–32)
CREAT SERPL-MCNC: 1.08 MG/DL (ref 0.6–1.3)
EOSINOPHIL # BLD AUTO: 0.44 THOUSAND/ΜL (ref 0–0.61)
EOSINOPHIL NFR BLD AUTO: 3 % (ref 0–6)
ERYTHROCYTE [DISTWIDTH] IN BLOOD BY AUTOMATED COUNT: 12.7 % (ref 11.6–15.1)
GFR SERPL CREATININE-BSD FRML MDRD: 74 ML/MIN/1.73SQ M
GLUCOSE SERPL-MCNC: 105 MG/DL (ref 65–140)
HCT VFR BLD AUTO: 49.2 % (ref 36.5–49.3)
HGB BLD-MCNC: 16.3 G/DL (ref 12–17)
IMM GRANULOCYTES # BLD AUTO: 0.04 THOUSAND/UL (ref 0–0.2)
IMM GRANULOCYTES NFR BLD AUTO: 0 % (ref 0–2)
LYMPHOCYTES # BLD AUTO: 3.88 THOUSANDS/ΜL (ref 0.6–4.47)
LYMPHOCYTES NFR BLD AUTO: 27 % (ref 14–44)
MCH RBC QN AUTO: 30 PG (ref 26.8–34.3)
MCHC RBC AUTO-ENTMCNC: 33.1 G/DL (ref 31.4–37.4)
MCV RBC AUTO: 91 FL (ref 82–98)
MONOCYTES # BLD AUTO: 1.07 THOUSAND/ΜL (ref 0.17–1.22)
MONOCYTES NFR BLD AUTO: 8 % (ref 4–12)
NEUTROPHILS # BLD AUTO: 8.68 THOUSANDS/ΜL (ref 1.85–7.62)
NEUTS SEG NFR BLD AUTO: 61 % (ref 43–75)
NRBC BLD AUTO-RTO: 0 /100 WBCS
P AXIS: 68 DEGREES
P AXIS: 68 DEGREES
P AXIS: 72 DEGREES
PLATELET # BLD AUTO: 409 THOUSANDS/UL (ref 149–390)
PMV BLD AUTO: 10 FL (ref 8.9–12.7)
POTASSIUM SERPL-SCNC: 4 MMOL/L (ref 3.5–5.3)
PR INTERVAL: 182 MS
PR INTERVAL: 184 MS
PR INTERVAL: 192 MS
QRS AXIS: -43 DEGREES
QRS AXIS: -59 DEGREES
QRS AXIS: -59 DEGREES
QRSD INTERVAL: 128 MS
QRSD INTERVAL: 130 MS
QRSD INTERVAL: 132 MS
QT INTERVAL: 352 MS
QT INTERVAL: 368 MS
QT INTERVAL: 406 MS
QTC INTERVAL: 423 MS
QTC INTERVAL: 450 MS
QTC INTERVAL: 465 MS
RBC # BLD AUTO: 5.43 MILLION/UL (ref 3.88–5.62)
SODIUM SERPL-SCNC: 137 MMOL/L (ref 136–145)
T WAVE AXIS: 105 DEGREES
T WAVE AXIS: 91 DEGREES
T WAVE AXIS: 93 DEGREES
VENTRICULAR RATE: 79 BPM
VENTRICULAR RATE: 87 BPM
VENTRICULAR RATE: 90 BPM
WBC # BLD AUTO: 14.19 THOUSAND/UL (ref 4.31–10.16)

## 2019-02-25 PROCEDURE — 93010 ELECTROCARDIOGRAM REPORT: CPT | Performed by: NURSE PRACTITIONER

## 2019-02-25 PROCEDURE — C1894 INTRO/SHEATH, NON-LASER: HCPCS | Performed by: NURSE PRACTITIONER

## 2019-02-25 PROCEDURE — 99233 SBSQ HOSP IP/OBS HIGH 50: CPT | Performed by: NURSE PRACTITIONER

## 2019-02-25 PROCEDURE — C1769 GUIDE WIRE: HCPCS | Performed by: NURSE PRACTITIONER

## 2019-02-25 PROCEDURE — 99152 MOD SED SAME PHYS/QHP 5/>YRS: CPT | Performed by: INTERNAL MEDICINE

## 2019-02-25 PROCEDURE — 93454 CORONARY ARTERY ANGIO S&I: CPT | Performed by: INTERNAL MEDICINE

## 2019-02-25 PROCEDURE — 99152 MOD SED SAME PHYS/QHP 5/>YRS: CPT | Performed by: NURSE PRACTITIONER

## 2019-02-25 PROCEDURE — B2111ZZ FLUOROSCOPY OF MULTIPLE CORONARY ARTERIES USING LOW OSMOLAR CONTRAST: ICD-10-PCS | Performed by: INTERNAL MEDICINE

## 2019-02-25 PROCEDURE — 93454 CORONARY ARTERY ANGIO S&I: CPT | Performed by: NURSE PRACTITIONER

## 2019-02-25 PROCEDURE — 99232 SBSQ HOSP IP/OBS MODERATE 35: CPT | Performed by: NURSE PRACTITIONER

## 2019-02-25 PROCEDURE — 85730 THROMBOPLASTIN TIME PARTIAL: CPT | Performed by: INTERNAL MEDICINE

## 2019-02-25 PROCEDURE — 93005 ELECTROCARDIOGRAM TRACING: CPT

## 2019-02-25 PROCEDURE — 80048 BASIC METABOLIC PNL TOTAL CA: CPT | Performed by: NURSE PRACTITIONER

## 2019-02-25 PROCEDURE — 85025 COMPLETE CBC W/AUTO DIFF WBC: CPT | Performed by: NURSE PRACTITIONER

## 2019-02-25 RX ORDER — HEPARIN SODIUM 1000 [USP'U]/ML
INJECTION, SOLUTION INTRAVENOUS; SUBCUTANEOUS CODE/TRAUMA/SEDATION MEDICATION
Status: COMPLETED | OUTPATIENT
Start: 2019-02-25 | End: 2019-02-25

## 2019-02-25 RX ORDER — MIDAZOLAM HYDROCHLORIDE 1 MG/ML
INJECTION INTRAMUSCULAR; INTRAVENOUS CODE/TRAUMA/SEDATION MEDICATION
Status: COMPLETED | OUTPATIENT
Start: 2019-02-25 | End: 2019-02-25

## 2019-02-25 RX ORDER — LIDOCAINE HYDROCHLORIDE 10 MG/ML
INJECTION, SOLUTION INFILTRATION; PERINEURAL CODE/TRAUMA/SEDATION MEDICATION
Status: COMPLETED | OUTPATIENT
Start: 2019-02-25 | End: 2019-02-25

## 2019-02-25 RX ORDER — VERAPAMIL HYDROCHLORIDE 2.5 MG/ML
INJECTION, SOLUTION INTRAVENOUS CODE/TRAUMA/SEDATION MEDICATION
Status: COMPLETED | OUTPATIENT
Start: 2019-02-25 | End: 2019-02-25

## 2019-02-25 RX ORDER — METOPROLOL SUCCINATE 25 MG/1
25 TABLET, EXTENDED RELEASE ORAL ONCE
Status: COMPLETED | OUTPATIENT
Start: 2019-02-25 | End: 2019-02-25

## 2019-02-25 RX ORDER — NITROGLYCERIN 20 MG/100ML
INJECTION INTRAVENOUS CODE/TRAUMA/SEDATION MEDICATION
Status: COMPLETED | OUTPATIENT
Start: 2019-02-25 | End: 2019-02-25

## 2019-02-25 RX ORDER — METOPROLOL SUCCINATE 50 MG/1
50 TABLET, EXTENDED RELEASE ORAL DAILY
Status: DISCONTINUED | OUTPATIENT
Start: 2019-02-26 | End: 2019-02-27 | Stop reason: HOSPADM

## 2019-02-25 RX ORDER — ASPIRIN 81 MG/1
324 TABLET, CHEWABLE ORAL ONCE
Status: DISCONTINUED | OUTPATIENT
Start: 2019-02-25 | End: 2019-02-26

## 2019-02-25 RX ORDER — SODIUM CHLORIDE 9 MG/ML
50 INJECTION, SOLUTION INTRAVENOUS CONTINUOUS
Status: DISPENSED | OUTPATIENT
Start: 2019-02-25 | End: 2019-02-25

## 2019-02-25 RX ORDER — SODIUM CHLORIDE 9 MG/ML
50 INJECTION, SOLUTION INTRAVENOUS CONTINUOUS
Status: DISCONTINUED | OUTPATIENT
Start: 2019-02-25 | End: 2019-02-25

## 2019-02-25 RX ORDER — FENTANYL CITRATE 50 UG/ML
INJECTION, SOLUTION INTRAMUSCULAR; INTRAVENOUS CODE/TRAUMA/SEDATION MEDICATION
Status: COMPLETED | OUTPATIENT
Start: 2019-02-25 | End: 2019-02-25

## 2019-02-25 RX ADMIN — MIDAZOLAM 1 MG: 1 INJECTION INTRAMUSCULAR; INTRAVENOUS at 13:23

## 2019-02-25 RX ADMIN — ASPIRIN 81 MG: 81 TABLET, COATED ORAL at 08:55

## 2019-02-25 RX ADMIN — DOCUSATE SODIUM 100 MG: 100 CAPSULE, LIQUID FILLED ORAL at 18:05

## 2019-02-25 RX ADMIN — HEPARIN SODIUM 4000 UNITS: 1000 INJECTION INTRAVENOUS; SUBCUTANEOUS at 13:25

## 2019-02-25 RX ADMIN — NITROGLYCERIN 200 MCG: 20 INJECTION INTRAVENOUS at 13:25

## 2019-02-25 RX ADMIN — LIDOCAINE HYDROCHLORIDE 1 ML: 10 INJECTION, SOLUTION INFILTRATION; PERINEURAL at 13:22

## 2019-02-25 RX ADMIN — DOCUSATE SODIUM 100 MG: 100 CAPSULE, LIQUID FILLED ORAL at 08:55

## 2019-02-25 RX ADMIN — STANDARDIZED SENNA CONCENTRATE 8.6 MG: 8.6 TABLET ORAL at 08:56

## 2019-02-25 RX ADMIN — METOPROLOL SUCCINATE 25 MG: 25 TABLET, EXTENDED RELEASE ORAL at 20:22

## 2019-02-25 RX ADMIN — FENTANYL CITRATE 25 MCG: 50 INJECTION INTRAMUSCULAR; INTRAVENOUS at 13:23

## 2019-02-25 RX ADMIN — MIDAZOLAM 2 MG: 1 INJECTION INTRAMUSCULAR; INTRAVENOUS at 13:21

## 2019-02-25 RX ADMIN — FENTANYL CITRATE 50 MCG: 50 INJECTION INTRAMUSCULAR; INTRAVENOUS at 13:21

## 2019-02-25 RX ADMIN — ATORVASTATIN CALCIUM 40 MG: 40 TABLET, FILM COATED ORAL at 18:05

## 2019-02-25 RX ADMIN — IOHEXOL 40 ML: 350 INJECTION, SOLUTION INTRAVENOUS at 13:30

## 2019-02-25 RX ADMIN — MELOXICAM 7.5 MG: 7.5 TABLET ORAL at 08:56

## 2019-02-25 RX ADMIN — METOPROLOL TARTRATE 25 MG: 25 TABLET, FILM COATED ORAL at 08:55

## 2019-02-25 RX ADMIN — VERAPAMIL HYDROCHLORIDE 2.5 MG: 2.5 INJECTION INTRAVENOUS at 13:25

## 2019-02-25 RX ADMIN — CLOPIDOGREL BISULFATE 75 MG: 75 TABLET ORAL at 08:56

## 2019-02-25 RX ADMIN — FUROSEMIDE 20 MG: 10 INJECTION, SOLUTION INTRAVENOUS at 08:56

## 2019-02-25 NOTE — ASSESSMENT & PLAN NOTE
· Trending upwards (0 11 -> 0 40 -> 1 51 -> 5 56)  · continued on heparin drip  · Loaded with Plavix  · Started on ASA, beta-blocker and statin  · Cardiology following and appreciate their input  · Cardiac cath planned for  About 4pm today   Cardiology suspects multivessel disease

## 2019-02-25 NOTE — PHYSICIAN ADVISOR
Current patient class: Inpatient  The patient is currently on Hospital Day: 3 at [facility]      The patient was admitted to the hospital at [phone] on 2/23/19 for the following diagnosis:  Shortness of breath [R06 02]  Acute respiratory distress [R06 03]  CHF (congestive heart failure) ([facility] [address] ) [I50 9]  EKG abnormalities [R94 31]  Elevated troponin [R74 8]       There is documentation in the medical record of an expected length of stay of at least 2 midnights  The patient is therefore expected to satisfy the 2 midnight benchmark and given the 2 midnight presumption is appropriate for INPATIENT ADMISSION  Given this expectation of a satisfying stay, CMS instructs us that the patient is most often appropriate for inpatient admission under part A provided medical necessity is documented in the chart  After review of the relevant documentation, labs, vital signs and test results, the patient is appropriate for INPATIENT ADMISSION  Admission to the hospital as an inpatient is a complex decision making process which requires the practitioner to consider the patients presenting complaint, history and physical examination and all relevant testing  With this in mind, in this case, the patient was deemed appropriate for INPATIENT ADMISSION  After review of the documentation and testing available at the time of the admission I concur with this clinical determination of medical necessity  Rationale is as follows: The patient is a 59 yrs old Male who presented to the ED at 2/23/2019  3:45 AM with a chief complaint of shortness of breath  When the patient came to the emergency department patient was found to be tachypneic with a respiratory rate of 24  On the labs on admission the patient was found to have a BMP 3544  The patient also had an elevated troponin of 0 4  Patient had echocardiogram which showed ejection fraction of 15%    Patient was seen by Cardiology and was put on IV Lasix on admission 40 mg IV q 12 hours  Patient's troponin also elevated up to 5 6  Patient was put on a heparin drip as well  They are considering a cardiac MRI and possible life vest   Given the documentation in the chart as well as the acute systolic congestive heart failure with severely depressed ejection fraction this patient is inpatient admission appropriate and crosses the 2 midnight benchmark as set by Medicare      The patients vitals on arrival were ED Triage Vitals   Temperature Pulse Respirations Blood Pressure SpO2   02/23/19 0349 02/23/19 0349 02/23/19 0349 02/23/19 0349 02/23/19 0349   98 8 °F (37 1 °C) 85 (!) 24 146/88 99 %      Temp Source Heart Rate Source Patient Position - Orthostatic VS BP Location FiO2 (%)   02/23/19 0349 02/23/19 0349 02/23/19 0349 02/23/19 0349 --   Oral Monitor Lying Left arm       Pain Score       02/23/19 0700       No Pain           Past Medical History:   Diagnosis Date    Hepatitis C     HLD (hyperlipidemia)     PAD [facility]peripheral artery disease) ([facility])     with claudication    PVD (peripheral vascular disease) ([facility] [address] )      Past Surgical History:   Procedure Laterality Date    HERNIA REPAIR      NECK SURGERY  11/2016           Consults have been placed to:   IP CONSULT TO CARDIOLOGY    Vitals:    02/25/19 1400 02/25/19 1415 02/25/19 1430 02/25/19 1500   BP: 95/64 104/72 106/79 118/89   BP Location: Left arm Left arm Left arm Left arm   Pulse: 78 78 86 86   Resp: 18 18 18 18   Temp:    98 1 °F (36 7 °C)   TempSrc:    Oral   SpO2:    99%   Weight:       Height:           Most recent labs:    Recent Labs     02/23/19  0355  02/23/19  0741  02/24/19  0257 02/25/19  0516   WBC  --   --   --   --   --  14 19*   HGB  --   --   --   --   --  16 3   HCT  --   --   --   --   --  49 2   PLT  --   --   --   --   --  409*   K 4 4  --   --   --  4 1 4 0   CALCIUM 8 9  --   --   --  9 1 9 2   BUN 16  --   --   --  20 21   CREATININE 1 11  --   --   --  1 15 1 08   INR  -- --  1 05  --   --   --    TROPONINI  --    < >  --    < > 8 39*  --    AST 11  --   --   --   --   --    ALT 25  --   --   --   --   --    ALKPHOS 85  --   --   --   --   --     < > = values in this interval not displayed         Scheduled Meds:  Current Facility-Administered Medications:  acetaminophen 650 mg Oral Q6H PRN Keon Jackson MD   aspirin 81 mg Oral Daily Keon Jackson MD   aspirin 324 mg Oral Once JAME Schuler   atorvastatin 40 mg Oral After Maricarmen Oliva MD   cyclobenzaprine 10 mg Oral BID PRN Keon Jackson MD   diazepam 2 mg Oral Q8H PRN Keon Jackson MD   docusate sodium 100 mg Oral BID Keon Jackson MD   furosemide 20 mg Intravenous Daily Liang Burrows DO   meloxicam 7 5 mg Oral Daily Keon Jackson MD   metoprolol succinate 25 mg Oral Once JAME Gamez   [START ON 2/26/2019] metoprolol succinate 50 mg Oral Daily JAME Gamez   nicotine 1 patch Transdermal Daily Keon Jackson MD   ondansetron 4 mg Intravenous Q8H PRN Keon Jackson MD   oxyCODONE-acetaminophen 1 tablet Oral Q8H PRN Keon Jackson MD   polyethylene glycol 17 g Oral Daily PRN Keon Jackson MD   senna 1 tablet Oral Daily Keon Jackson MD     Continuous Infusions:   PRN Meds:   acetaminophen    cyclobenzaprine    diazepam    ondansetron    oxyCODONE-acetaminophen    polyethylene glycol    Surgical procedures (if appropriate):

## 2019-02-25 NOTE — PLAN OF CARE
Problem: PAIN - ADULT  Goal: Verbalizes/displays adequate comfort level or baseline comfort level  Description  Interventions:  - Encourage patient to monitor pain and request assistance  - Assess pain using appropriate pain scale  - Administer analgesics based on type and severity of pain and evaluate response  - Implement non-pharmacological measures as appropriate and evaluate response  - Consider cultural and social influences on pain and pain management  - Notify physician/advanced practitioner if interventions unsuccessful or patient reports new pain  Outcome: Progressing     Problem: INFECTION - ADULT  Goal: Absence or prevention of progression during hospitalization  Description  INTERVENTIONS:  - Assess and monitor for signs and symptoms of infection  - Monitor lab/diagnostic results  - Monitor all insertion sites, i e  indwelling lines, tubes, and drains  - Monitor endotracheal (as able) and nasal secretions for changes in amount and color  - [place] appropriate cooling/warming therapies per order  - Administer medications as ordered  - Instruct and encourage patient and family to use good hand hygiene technique  - Identify and instruct in appropriate isolation precautions for identified infection/condition  Outcome: Progressing  Goal: Absence of fever/infection during neutropenic period  Description  INTERVENTIONS:  - Monitor WBC  - Implement neutropenic guidelines  Outcome: Progressing     Problem: SAFETY ADULT  Goal: Patient will remain free of falls  Description  INTERVENTIONS:  - Assess patient frequently for physical needs  -  Identify cognitive and physical deficits and behaviors that affect risk of falls    -  [place] fall precautions as indicated by assessment   - Educate patient/family on patient safety including physical limitations  - Instruct patient to call for assistance with activity based on assessment  - Modify environment to reduce risk of injury  - Consider OT/PT consult to assist with strengthening/mobility  Outcome: Progressing  Goal: Maintain or return to baseline ADL function  Description  INTERVENTIONS:  -  Assess patient's ability to carry out ADLs; assess patient's baseline for ADL function and identify physical deficits which impact ability to perform ADLs (bathing, care of mouth/teeth, toileting, grooming, dressing, etc )  - Assess/evaluate cause of self-care deficits   - Assess range of motion  - Assess patient's mobility; develop plan if impaired  - Assess patient's need for assistive devices and provide as appropriate  - Encourage maximum independence but intervene and supervise when necessary  ¯ Involve family in performance of ADLs  ¯ Assess for home care needs following discharge   ¯ Request OT consult to assist with ADL evaluation and planning for discharge  ¯ Provide patient education as appropriate  Outcome: Progressing  Goal: Maintain or return mobility status to optimal level  Description  INTERVENTIONS:  - Assess patient's baseline mobility status (ambulation, transfers, stairs, etc )    - Identify cognitive and physical deficits and behaviors that affect mobility  - Identify mobility aids required to assist with transfers and/or ambulation (gait belt, sit-to-stand, lift, walker, cane, etc )  - [place] fall precautions as indicated by assessment  - Record patient progress and toleration of activity level on Mobility SBAR; progress patient to next Phase/Stage  - Instruct patient to call for assistance with activity based on assessment  - Request Rehabilitation consult to assist with strengthening/weightbearing, etc   Outcome: Progressing     Problem: DISCHARGE PLANNING  Goal: Discharge to home or other facility with appropriate resources  Description  INTERVENTIONS:  - Identify barriers to discharge w/patient and caregiver  - Arrange for needed discharge resources and transportation as appropriate  - Identify discharge learning needs (meds, wound care, etc )  - Arrange for interpretive services to assist at discharge as needed  - Refer to Case Management Department for coordinating discharge planning if the patient needs post-hospital services based on physician/advanced practitioner order or complex needs related to functional status, cognitive ability, or social support system  Outcome: Progressing     Problem: Knowledge Deficit  Goal: Patient/family/caregiver demonstrates understanding of disease process, treatment plan, medications, and discharge instructions  Description  Complete learning assessment and assess knowledge base    Interventions:  - Provide teaching at level of understanding  - Provide teaching via preferred learning methods  Outcome: Progressing     Problem: DISCHARGE PLANNING - CARE MANAGEMENT  Goal: Discharge to post-acute care or home with appropriate resources  Description  INTERVENTIONS:  - Conduct assessment to determine patient/family and health care team treatment goals, and need for post-acute services based on payer coverage, community resources, and patient preferences, and barriers to discharge  - Address psychosocial, clinical, and financial barriers to discharge as identified in assessment in conjunction with the patient/family and health care team  - Arrange appropriate level of post-acute services according to patient?s   needs and preference and payer coverage in collaboration with the physician and health care team  - Communicate with and update the patient/family, physician, and health care team regarding progress on the discharge plan  - Arrange appropriate transportation to post-acute venues  Outcome: Progressing     Problem: CARDIOVASCULAR - ADULT  Goal: Maintains optimal cardiac output and hemodynamic stability  Description  INTERVENTIONS:  - Monitor I/O, vital signs and rhythm  - Monitor for S/S and trends of decreased cardiac output i e  bleeding, hypotension  - Administer and titrate ordered vasoactive medications to optimize hemodynamic stability  - Assess quality of pulses, skin color and temperature  - Assess for signs of decreased coronary artery perfusion - ex   Angina  - Instruct patient to report change in severity of symptoms  Outcome: Progressing  Goal: Absence of cardiac dysrhythmias or at baseline rhythm  Description  INTERVENTIONS:  - Continuous cardiac monitoring, monitor vital signs, obtain 12 lead EKG if indicated  - Administer antiarrhythmic and heart rate control medications as ordered  - Monitor electrolytes and administer replacement therapy as ordered  Outcome: Progressing

## 2019-02-25 NOTE — ASSESSMENT & PLAN NOTE
· Trending upwards (0 11 -> 0 40 -> 1 51 -> 5 56)  · Started on heparin drip  · Loaded with Plavix  · Started on ASA, beta-blocker and statin  · Cardiology following and appreciate their input  · Cardiac cath planned for tomorrow   Cardiology suspects multivessel disease

## 2019-02-25 NOTE — TREATMENT PLAN
Cardiac cath today did not reveal any obstructive CAD/ischemic cause of his cardiomyopathy  Will obtain cardiac MRI  Discussed LifeVest with patient; he is agreeable  Will start approval process  DAVY aware

## 2019-02-25 NOTE — DISCHARGE INSTRUCTIONS
1  Please see the post cardiac catheterization/ angioseal closure device instructions and stent booklet  No heavy lifting, greater than 10 lbs  or strenuous  activity for 48 hrs  See the post cardiac catheterization/ angioseal closure device instructions  2 Remove band aid tomorrow  Shower and wash area-wrist gently with soap and water- beginning tomorrow  Rinse and pat dry  Apply new water seal band aid  Repeat this process for 5 days  No powders, creams lotions or antibiotic ointments  for 5 days  No tub baths, hot tubs or swimming for 5 days  3 No driving for 3 days      Take your medications as directed, and keep your follow up appointments  Adhere to a heart healthy lifestyle, maintaining a low sodium diet  Daily weight and record  If your weight increases 2-3 lbs in one day, or 5 lbs in 2 days, you are short of breath or have lower extremity swelling, please call the heart failure team at  [facility] at [phone]

## 2019-02-25 NOTE — ASSESSMENT & PLAN NOTE
· Patient presented with dyspnea on exertion orthopnea and significantly elevated proBNP  · Most likely new onset of heart failure, will rule out ACS given elevated troponin  · Cardiac enzymes are trending up  · Continued on heparin drip    · Aspirin loading dose given in the ER  · Will continue daily aspirin, statin  · Lasix 40 mg IV b i d   · Intake output daily weight 75 1 kg   · Heart healthy diet  · Check fasting lipid panel , wnl   · hemoglobin A1c 6 4  · echo to assess ejection fraction EF 15%   · Cardiology consult appreciated

## 2019-02-25 NOTE — UTILIZATION REVIEW
Initial Clinical Review  Admission: Date/Time/Statement: 2/23/19 @ 0607   Orders Placed This Encounter   Procedures    Inpatient Admission (expected length of stay for this patient Order details is greater than two midnights)     Standing Status:   Standing     Number of Occurrences:   1     Order Specific Question:   Admitting Physician     Answer:   Juan Francisco Bustamante     Order Specific Question:   Level of Care     Answer:   Med Surg [16]     Order Specific Question:   Estimated length of stay     Answer:   More than 2 Midnights     Order Specific Question:   Certification     Answer:   I certify that inpatient services are medically necessary for this patient for a duration of greater than two midnights  See H&P and MD Progress Notes for additional information about the patient's course of treatment  ED: Date/Time/Mode of Arrival:   ED Arrival Information     Expected Arrival Acuity Means of Arrival Escorted By Service Admission Type    - 2/23/2019 03:42 Urgent Walk-In Self Hospitalist Urgent    Arrival Complaint    Shortness of Breath; Shoulder Pain        Chief Complaint:   Chief Complaint   Patient presents with    Shortness of Breath     pt reports SOB X4 days  States he has "calcifications of the lung"  States he was lying in bed and dyspnic at rest  Presents for evaluation     History of Illness: Yonathan Gutierres is a 59 y o  male with history of PVD, smoker 1 PPD who presented from home for evaluation of progressively getting worse shortness of breath with activities, orthopnea over the past 4 days  Patient denies getting worse leg edema fever chills productive cough  He follows with vascular surgeon for his PVD and  "blockage in the  right leg"    ED Vital Signs:   ED Triage Vitals   Temperature Pulse Respirations Blood Pressure SpO2   02/23/19 0349 02/23/19 0349 02/23/19 0349 02/23/19 0349 02/23/19 0349   98 8 °F (37 1 °C) 85 (!) 24 146/88 99 %      Temp Source Heart Rate Source Patient Position - Orthostatic VS BP Location FiO2 (%)   02/23/19 0349 02/23/19 0349 02/23/19 0349 02/23/19 0349 --   Oral Monitor Lying Left arm       Pain Score       02/23/19 0700       No Pain        Wt Readings from Last 1 Encounters:   02/25/19 75 1 kg (165 lb 8 oz)     Pertinent Labs/Diagnostic Test Results:   WBC Thousand/uL 10 33*   HEMOGLOBIN g/dL 15 4   HEMATOCRIT % 48 2   PLATELETS Thousands/uL 430*     SODIUM mmol/L 139   POTASSIUM mmol/L 4 4   CHLORIDE mmol/L 105   CO2 mmol/L 27   BUN mg/dL 16   CREATININE mg/dL 1 11   ANION GAP mmol/L 7   CALCIUM mg/dL 8 9   ALBUMIN g/dL 3 5   TOTAL BILIRUBIN mg/dL 0 24   ALK PHOS U/L 85   ALT U/L 25   AST U/L 11   GLUCOSE RANDOM mg/dL 179*   Chest X:  Cardiomegaly, increased interstitial edema, cephalization consistent with congestive heart failure      ECG:  Normal Sinus rhythm, ST depression in V5 V6    TROP 0 11  NT-proBNP 3,544 pg/mL   D-Dimer 508    ED Treatment:   Medication Administration from 02/23/2019 0342 to 02/23/2019 1112       Date/Time Order Dose Route Action Action by Comments     02/23/2019 0448 aspirin chewable tablet 324 mg 324 mg Oral Given Magy Buchanan RN      02/23/2019 0636 furosemide (LASIX) injection 20 mg 20 mg Intravenous Given Gio Umana RN      02/23/2019 0851 docusate sodium (COLACE) capsule 100 mg 100 mg Oral Given Waqas Johnson RN      02/23/2019 1180 senna (SENOKOT) tablet 8 6 mg 8 6 mg Oral Not Given Waqas Johnson RN      02/23/2019 0598 nicotine (NICODERM CQ) 21 mg/24 hr TD 24 hr patch 1 patch 1 patch Transdermal Not Given Waqas Johnson RN      02/23/2019 0753 furosemide (LASIX) injection 40 mg 40 mg Intravenous Given Waqas Johnson RN      02/23/2019 0851 atorvastatin (LIPITOR) tablet 80 mg 80 mg Oral Given Waqas Johnson RN      02/23/2019 0491 heparin (porcine) 25,000 units in 250 mL infusion (premix) 12 Units/kg/hr Intravenous [address] Waqas Johnson RN      02/23/2019 7196 heparin (porcine) injection 4,000 Units 4,000 Units Intravenous Given Rakel King RN         Past Medical/Surgical History: Active Ambulatory Problems     Diagnosis Date Noted    Cervical disc disease 11/17/2017    Cervical radiculopathy 11/17/2017    Chronic hepatitis C without hepatic coma ([facility] ) 11/08/2016    Diverticulosis of large intestine without hemorrhage 11/08/2016    Dyslipidemia 01/15/2019    Erectile dysfunction 11/08/2016    Left wrist pain 01/15/2019    Lumbar disc disease with radiculopathy 12/07/2016    Recurrent right inguinal hernia 01/31/2017    Tobacco abuse disorder 38/53/5869    Umbilical hernia without obstruction and without gangrene 11/08/2016    Superficial thrombophlebitis 01/30/2019    Atheroscler native arteries the extremities w/intermit claudication ([facility] ) 01/30/2019    Atherosclerosis of native artery of right lower extremity with intermittent claudication ([facility] ) 02/15/2019     Past Medical History:   Diagnosis Date    Hepatitis C     HLD (hyperlipidemia)     PAD (peripheral artery disease) ([facility] )     PVD (peripheral vascular disease) ([place] [address] )      Admitting Diagnosis: Shortness of breath [R06 02]  Acute respiratory distress [R06 03]  CHF (congestive heart failure) (HCC) [I50 9]  EKG abnormalities [R94 31]  Elevated troponin [R74 8]  Age/Sex: 59 y o  male  Assessment/Plan:   * Dyspnea on exertion  Assessment & Plan  Patient presented with dyspnea on exertion orthopnea and significantly elevated proBNP  Most likely new onset of heart failure, will rule out ACS given elevated troponin  Admitted to telemetry, serial cardiac enzymes  Aspirin loading dose given in the ER  Will continue daily aspirin, statin  Lasix 40 mg IV b i d    Intake output daily weight  Heart healthy diet  Check fasting lipid panel ,hemoglobin A1c  Obtain echo to assess ejection fraction , structural or wall motion abnormality  Cardiology consult     Elevated troponin  Assessment & Plan  Management as above     Dyslipidemia  Assessment & Plan  Low-cholesterol diet  Check fasting lipid panel     Tobacco abuse disorder  Assessment & Plan  Nicotine patch ordered     Peripheral vascular disease ([facility] )  Assessment & Plan  Patient follows with vascular surgeon  Continue aspirin ,statin increased to 40 mg daily     Elevated random blood glucose level  Assessment & Plan  Further management pending hemoglobin A1c level   VTE Prophylaxis: Enoxaparin (Lovenox)  / sequential compression device   Anticipated Length of Stay:  Patient will be admitted on an Inpatient basis with an anticipated length of stay of  > 2 midnights     Justification for Hospital Stay:  ACS CHF workup, cardiology consult  Admission Orders:  Scheduled Meds:   Current Facility-Administered Medications:  acetaminophen 650 mg Oral Q6H PRN Cierra Ramesh MD    aspirin 81 mg Oral Daily Cierra Ramesh MD    aspirin 324 mg Oral Once Gloria Flattery, CRNP    atorvastatin 40 mg Oral After Waldemar Babin MD    clopidogrel 75 mg Oral Daily Liang Burrows DO    cyclobenzaprine 10 mg Oral BID PRN Cierra Ramesh MD    diazepam 2 mg Oral Q8H PRN Cierra Ramesh MD    docusate sodium 100 mg Oral BID Cierra Ramesh MD    furosemide 20 mg Intravenous Daily Liang Burrows DO    heparin (porcine) 3-20 Units/kg/hr (Order-Specific) Intravenous Titrated Cierra Ramesh MD Last Rate: 20 Units/kg/hr (02/25/19 0040)   meloxicam 7 5 mg Oral Daily Cierra Ramesh MD    metoprolol tartrate 25 mg Oral Q12H [address] Gloria Flattery, CRNP    nicotine 1 patch Transdermal Daily Cierra Ramesh MD    ondansetron 4 mg Intravenous Q8H PRN Cierra Ramesh MD    oxyCODONE-acetaminophen 1 tablet Oral Q8H PRN Cierra Ramesh MD    polyethylene glycol 17 g Oral Daily PRN Cierra Ramesh MD    senna 1 tablet Oral Daily Cierra Ramesh MD      Continuous Infusions:   heparin (porcine) 3-20 Units/kg/hr (Order-Specific) Last Rate: 20 Units/kg/hr (02/25/19 0040)     NPO, pre procedure > Heart cath: pending  Up and OOB as tolerated  TELM  Malvin SCDs

## 2019-02-25 NOTE — SOCIAL WORK
Per request of Cardiology CRSADIQ Coronel, CM submitted referral for pt to receive a Julius Vera  CM to follow

## 2019-02-25 NOTE — PROGRESS NOTES
Progress Note Luciana Jane 1960, 59 y o  male MRN: [id number]    Unit/Bed#: [facility] 427-01 Encounter: [id number]    Primary Care Provider: Roman Kahn DO   Date and time admitted to hospital: 2/23/2019  3:45 AM        * Dyspnea on exertion  Assessment & Plan  · Patient presented with dyspnea on exertion orthopnea and significantly elevated proBNP  · Most likely new onset of heart failure, will rule out ACS given elevated troponin  · Cardiac enzymes are trending up  · Continued on heparin drip  · Aspirin loading dose given in the ER  · Will continue daily aspirin, statin  · Lasix 40 mg IV b i d   · Intake output daily weight 75 1 kg   · Heart healthy diet  · Check fasting lipid panel , wnl   · hemoglobin A1c 6 4  · echo to assess ejection fraction EF 15%   · Cardiology consult appreciated     Elevated random blood glucose level  Assessment & Plan  · A1c  6 4  · No prior history of diabetes  NSTEMI (non-ST elevated myocardial infarction) ([facility])  Assessment & Plan  · Trending upwards (0 11 -> 0 40 -> 1 51 -> 5 56)  · continued on heparin drip  · Loaded with Plavix  · Started on ASA, beta-blocker and statin  · Cardiology following and appreciate their input  · Cardiac cath planned for  About 4pm today   Cardiology suspects multivessel disease  Tobacco abuse disorder  Assessment & Plan  · Encourage cessation  · Continue Nicotine patch  · Pt reports he doesn't smoke anymore     Dyslipidemia  Assessment & Plan  Low-cholesterol diet   Check fasting lipid panel wnl     Peripheral vascular disease [facility])  Assessment & Plan  · Patient follows with vascular surgeon  · Continue aspirin  · Statin increased to 40 mg daily  VTE Pharmacologic Prophylaxis:   Pharmacologic: Heparin Drip  Mechanical VTE Prophylaxis in Place: Yes    Patient Centered Rounds: I have performed bedside rounds with nursing staff today      Discussions with Specialists or Other Care Team Provider:  Nursing    Education and Discussions with Family / Patient:  Patient    Time Spent for Care: 30 minutes  More than 50% of total time spent on counseling and coordination of care as described above  Current Length of Stay: 2 day(s)    Current Patient Status: Inpatient   Certification Statement: The patient will continue to require additional inpatient hospital stay due to Plan for cardiac catheterization today at around 4:00 p m  Findings will determine plan of care    Discharge Plan:  Home when medically stable    Code Status: Level 1 - Full Code      Subjective:   Patient does report frequent episodes of nausea  Also reports shortness of breath with ambulation and activity  At rest patient has no complaints  Does appear slightly tachypneic when talking to patient  Otherwise no cough no chest pain, no palpitations  Objective:     Vitals:   Temp (24hrs), Av 8 °F (36 6 °C), Min:97 4 °F (36 3 °C), Max:98 5 °F (36 9 °C)    Temp:  [97 4 °F (36 3 °C)-98 5 °F (36 9 °C)] 97 4 °F (36 3 °C)  HR:  [75-89] 80  Resp:  [18] 18  BP: (110-122)/(74-84) 114/76  SpO2:  [94 %-99 %] 99 %  Body mass index is 24 44 kg/m²  Input and Output Summary (last 24 hours): Intake/Output Summary (Last 24 hours) at 2019 0845  Last data filed at 2019 1905  Gross per 24 hour   Intake 536 6 ml   Output 1575 ml   Net -1038 4 ml       Physical Exam:     Physical Exam   Constitutional: He is oriented to person, place, and time  He appears well-developed and well-nourished  No distress  Eyes: Conjunctivae are normal  Right eye exhibits no discharge  Left eye exhibits no discharge  Neck: No tracheal deviation present  No thyromegaly present  Cardiovascular: Normal rate  Exam reveals no gallop and no friction rub  No murmur heard  Pulmonary/Chest: Effort normal  No stridor  No respiratory distress  He has no wheezes  He has no rales  He exhibits no tenderness  Abdominal: Soft     Musculoskeletal: He exhibits no edema, tenderness or deformity  Lymphadenopathy:     He has no cervical adenopathy  Neurological: He is oriented to person, place, and time  Skin: Skin is warm  No rash noted  He is not diaphoretic  No erythema  No pallor  Psychiatric: He has a normal mood and affect  Additional Data:     Labs:    Results from last 7 days   Lab Units 02/25/19  0516   WBC Thousand/uL 14 19*   HEMOGLOBIN g/dL 16 3   HEMATOCRIT % 49 2   PLATELETS Thousands/uL 409*   NEUTROS PCT % 61   LYMPHS PCT % 27   MONOS PCT % 8   EOS PCT % 3     Results from last 7 days   Lab Units 02/25/19  0516  02/23/19  0355   SODIUM mmol/L 137   < > 139   POTASSIUM mmol/L 4 0   < > 4 4   CHLORIDE mmol/L 105   < > 105   CO2 mmol/L 24   < > 27   BUN mg/dL 21   < > 16   CREATININE mg/dL 1 08   < > 1 11   ANION GAP mmol/L 8   < > 7   CALCIUM mg/dL 9 2   < > 8 9   ALBUMIN g/dL  --   --  3 5   TOTAL BILIRUBIN mg/dL  --   --  0 24   ALK PHOS U/L  --   --  85   ALT U/L  --   --  25   AST U/L  --   --  11   GLUCOSE RANDOM mg/dL 105   < > 179*    < > = values in this interval not displayed  Results from last 7 days   Lab Units 02/23/19  0741   INR  1 05         Results from last 7 days   Lab Units 02/24/19  0257   HEMOGLOBIN A1C % 6 4*               * I Have Reviewed All Lab Data Listed Above  * Additional Pertinent Lab Tests Reviewed:  All Labs Within Last 24 Hours Reviewed    Imaging:    Imaging Reports Reviewed Today Include: reviewed     Recent Cultures (last 7 days):           Last 24 Hours Medication List:     Current Facility-Administered Medications:  acetaminophen 650 mg Oral Q6H PRN José Manuel Cobb MD    aspirin 81 mg Oral Daily José Manuel Cobb MD    aspirin 324 mg Oral Once JAME Wilson    atorvastatin 40 mg Oral After Melodi Dancer, MD    clopidogrel 75 mg Oral Daily Liang Burrows DO    cyclobenzaprine 10 mg Oral BID PRN José Manuel Cobb MD    diazepam 2 mg Oral Q8H PRN José Manuel Cobb MD    docusate sodium 100 mg Oral BID Rosio Bucio MD    furosemide 20 mg Intravenous Daily Liang Burrows DO    heparin (porcine) 3-20 Units/kg/hr (Order-Specific) Intravenous Titrated Rosio Bucio MD Last Rate: 20 Units/kg/hr (02/25/19 0040)   meloxicam 7 5 mg Oral Daily Rosio Bucio MD    metoprolol tartrate 25 mg Oral Q12H [address] JAME Andrade    nicotine 1 patch Transdermal Daily Rosio Bucio MD    ondansetron 4 mg Intravenous Q8H PRN Rosio Bucio MD    oxyCODONE-acetaminophen 1 tablet Oral Q8H PRN Rosio Bucio MD    polyethylene glycol 17 g Oral Daily PRN Rosio Bucio MD    senna 1 tablet Oral Daily Rosio Bucio MD         Today, Patient Was Seen By: JAME Bay    ** Please Note: Dictation voice to text software may have been used in the creation of this document   **

## 2019-02-25 NOTE — PROGRESS NOTES
Progress Note Kelley Luna 1960, 59 y o  male MRN: [id number]    Unit/Bed#: [facility] 427-01 Encounter: [id number]    Primary Care Provider: Kodi Mcgill DO   Date and time admitted to hospital: 2/23/2019  3:45 AM        * Dyspnea on exertion  Assessment & Plan  · Patient presented with dyspnea on exertion orthopnea and significantly elevated proBNP  · Most likely new onset of heart failure, will rule out ACS given elevated troponin  · Cardiac enzymes are trending up  · Started on heparin drip  · Aspirin loading dose given in the ER  · Will continue daily aspirin, statin  · Lasix 40 mg IV b i d   · Intake output daily weight  · Heart healthy diet  · Check fasting lipid panel ,hemoglobin A1c  · Obtain echo to assess ejection fraction , structural or wall motion abnormality  · Cardiology consult pending  Elevated random blood glucose level  Assessment & Plan  · A1c  6 4  · No prior history of diabetes  NSTEMI (non-ST elevated myocardial infarction) ([facility])  Assessment & Plan  · Trending upwards (0 11 -> 0 40 -> 1 51 -> 5 56)  · Started on heparin drip  · Loaded with Plavix  · Started on ASA, beta-blocker and statin  · Cardiology following and appreciate their input  · Cardiac cath planned for tomorrow   Cardiology suspects multivessel disease  Tobacco abuse disorder  Assessment & Plan  · Encourage cessation  · Continue Nicotine patch  Dyslipidemia  Assessment & Plan  Low-cholesterol diet  Check fasting lipid panel    Peripheral vascular disease [facility])  Assessment & Plan  · Patient follows with vascular surgeon  · Continue aspirin  · Statin increased to 40 mg daily  VTE Pharmacologic Prophylaxis:   Pharmacologic: Heparin Drip  Mechanical VTE Prophylaxis in Place: Yes    Patient Centered Rounds: I have performed bedside rounds with nursing staff today      Discussions with Specialists or Other Care Team Provider: nursing     Education and Discussions with Family / Patient: patient Time Spent for Care: 20 minutes  More than 50% of total time spent on counseling and coordination of care as described above  Current Length of Stay: 2 day(s)    Current Patient Status: Inpatient   Certification Statement: The patient will continue to require additional inpatient hospital stay due to plan for cath tomorrow    Discharge Plan: to home when final plan deter mend per cards     Code Status: Level 1 - Full Code      Subjective:   Pt has no compliants except for occ sob and some mild nausea periodically     Objective:     Vitals:   Temp (24hrs), Av 8 °F (36 6 °C), Min:97 4 °F (36 3 °C), Max:98 5 °F (36 9 °C)    Temp:  [97 4 °F (36 3 °C)-98 5 °F (36 9 °C)] 97 4 °F (36 3 °C)  HR:  [75-89] 80  Resp:  [18] 18  BP: (110-122)/(74-84) 114/76  SpO2:  [94 %-99 %] 99 %  Body mass index is 24 44 kg/m²  Input and Output Summary (last 24 hours): Intake/Output Summary (Last 24 hours) at 2019 4636  Last data filed at 2019 7924  Gross per 24 hour   Intake 536 6 ml   Output 1575 ml   Net -1038 4 ml       Physical Exam:     Physical Exam   Constitutional: He is oriented to person, place, and time  He appears well-developed  HENT:   Head: Atraumatic  Eyes: Pupils are equal, round, and reactive to light  Neck: Normal range of motion  Neck supple  No JVD present  No tracheal deviation present  No thyromegaly present  Cardiovascular: Normal rate  Pulmonary/Chest: Effort normal  No stridor  No respiratory distress  He has no wheezes  He has no rales  He exhibits no tenderness  Abdominal: Soft  He exhibits no distension and no mass  There is no tenderness  There is no rebound and no guarding  No hernia  Musculoskeletal: He exhibits no edema, tenderness or deformity  Lymphadenopathy:     He has no cervical adenopathy  Neurological: He is oriented to person, place, and time  Skin: Skin is warm  No rash noted  No erythema  No pallor  Psychiatric: He has a normal mood and affect  Additional Data:     Labs:    Results from last 7 days   Lab Units 02/25/19  0516   WBC Thousand/uL 14 19*   HEMOGLOBIN g/dL 16 3   HEMATOCRIT % 49 2   PLATELETS Thousands/uL 409*   NEUTROS PCT % 61   LYMPHS PCT % 27   MONOS PCT % 8   EOS PCT % 3     Results from last 7 days   Lab Units 02/25/19  0516  02/23/19  0355   SODIUM mmol/L 137   < > 139   POTASSIUM mmol/L 4 0   < > 4 4   CHLORIDE mmol/L 105   < > 105   CO2 mmol/L 24   < > 27   BUN mg/dL 21   < > 16   CREATININE mg/dL 1 08   < > 1 11   ANION GAP mmol/L 8   < > 7   CALCIUM mg/dL 9 2   < > 8 9   ALBUMIN g/dL  --   --  3 5   TOTAL BILIRUBIN mg/dL  --   --  0 24   ALK PHOS U/L  --   --  85   ALT U/L  --   --  25   AST U/L  --   --  11   GLUCOSE RANDOM mg/dL 105   < > 179*    < > = values in this interval not displayed  Results from last 7 days   Lab Units 02/23/19  0741   INR  1 05         Results from last 7 days   Lab Units 02/24/19  0257   HEMOGLOBIN A1C % 6 4*               * I Have Reviewed All Lab Data Listed Above  * Additional Pertinent Lab Tests Reviewed:  All Labs Within Last 24 Hours Reviewed    Imaging:    Imaging Reports Reviewed Today Include: reviewed     Recent Cultures (last 7 days):           Last 24 Hours Medication List:     Current Facility-Administered Medications:  acetaminophen 650 mg Oral Q6H PRN José Manuel Cobb MD    aspirin 81 mg Oral Daily José Manuel Cobb MD    aspirin 324 mg Oral Once JAME Wilson    atorvastatin 40 mg Oral After Melodi Dancer, MD    clopidogrel 75 mg Oral Daily Liang Burrows DO    cyclobenzaprine 10 mg Oral BID PRN José Manuel Cobb MD    diazepam 2 mg Oral Q8H PRN José Manuel Cobb MD    docusate sodium 100 mg Oral BID José Manuel Cobb MD    furosemide 20 mg Intravenous Daily Liang Burrows DO    heparin (porcine) 3-20 Units/kg/hr (Order-Specific) Intravenous Titrated José Manuel Cobb MD Last Rate: 20 Units/kg/hr (02/25/19 0040)   meloxicam 7 5 mg Oral Daily Negrita Jacqueline Alford MD    metoprolol tartrate 25 mg Oral Q12H [address] JAME Michael    nicotine 1 patch Transdermal Daily Sotero Hui MD    ondansetron 4 mg Intravenous Q8H PRN Sotero Hui MD    oxyCODONE-acetaminophen 1 tablet Oral Q8H PRN Sotero Hui MD    polyethylene glycol 17 g Oral Daily PRN Sotero Hui, MD    senna 1 tablet Oral Daily Sotero Hui MD         Today, Patient Was Seen By: JAME Rocha    ** Please Note: Dictation voice to text software may have been used in the creation of this document   **

## 2019-02-25 NOTE — PROGRESS NOTES
General Cardiology   Progress Note -  [employer]   Javier Sportsman 59 y o  male MRN: [id number]    Unit/Bed#: [facility] 427-01 Encounter: [id number]    Assessment/ Plan:     New cardiomyopathy  Acute systolic congestive heart failure:   LVEF 15% with diffuse hypokinesis; No prior echo to compare  Possibly ischemic based on presentation with elevated troponin and EKG changes  For cardiac cath today  He is diuresing on lasix 20mg IV; not significantly volume overloaded on exam and able to lay flat and speak in full sentences  May need increase to diuretics but will wait until cath performed for LVEDP  NSTEMI: Type 1  Pt remains chest pain free on IV heparin  Started on ASA/plavix, statin and BB  Prior CT chest at [facility] this month showed severe coronary calcifications  For cardiac cath today; further plan pending results of cath  Hx of Hep C: completed treatment with [facility]  Tobacco abuse: strongly advised cessation; nicotine patch  PVD with claudication: f/b vascular surgery as OP  On ASA/statin    Subjective: Pt seen for follow up; he is feeling somewhat better today  Less short of breath  He has not had any recurrent chest pain  Is putting out large amount of urine  NPO for cardiac cath today  Reviewed echo results with patient showing new cardiomyopathy with LVEF 15%  Review of Systems   Constitution: Negative for decreased appetite and fever  Cardiovascular: Positive for dyspnea on exertion  Negative for chest pain, leg swelling, orthopnea, palpitations and syncope  Respiratory: Negative for cough, shortness of breath and sleep disturbances due to breathing  Gastrointestinal: Positive for nausea  Negative for abdominal pain and vomiting  Genitourinary: Negative for dysuria  Large amounts of urine output   Neurological: Negative for dizziness and light-headedness  Psychiatric/Behavioral: Negative for altered mental status       Objective:   Vitals: Blood pressure 114/76, pulse 80, temperature (!) 97 4 °F (36 3 °C), temperature source Oral, resp  rate 18, height 5' 9" (1 753 m), weight 75 1 kg (165 lb 8 oz), SpO2 99 %  ,   Body mass index is 24 44 kg/m²  ,     Systolic (09XJT), HSM:209 , Min:110 , UKU:269     Diastolic (45ZTP), XPN:09, Min:74, Max:84      Intake/Output Summary (Last 24 hours) at 2/25/2019 1040  Last data filed at 2/25/2019 4780  Gross per 24 hour   Intake 536 6 ml   Output 1575 ml   Net -1038 4 ml     Weight (last 2 days)     Date/Time   Weight    02/25/19 0600   75 1 (165 5)    02/24/19 0600   74 8 (164 9)    02/23/19 0349   79 4 (175)            Telemetry Review:   Sinus rhythm, no significant events  Physical Exam   Constitutional: He is oriented to person, place, and time  No distress  HENT:   Head: Normocephalic and atraumatic  Neck: No JVD present  Cardiovascular: Normal rate, regular rhythm, S1 normal, S2 normal and normal heart sounds  No murmur heard  Pulmonary/Chest: Effort normal and breath sounds normal  No respiratory distress  He has no wheezes  LS CTA, on RA; no distress; able to lay flat and speak in full sentences  Abdominal: Soft  Musculoskeletal: He exhibits no edema  Neurological: He is alert and oriented to person, place, and time  Skin: Skin is warm and dry  He is not diaphoretic  Psychiatric: He has a normal mood and affect  His behavior is normal    Nursing note and vitals reviewed      LABORATORY RESULTS  Results from last 7 days   Lab Units 02/24/19  0257 02/23/19  2331 02/23/19  2026   TROPONIN I ng/mL 8 39* 10 40* 10 20*     CBC with diff: Results from last 7 days   Lab Units 02/25/19  0516 02/23/19  0354   WBC Thousand/uL 14 19* 10 33*   HEMOGLOBIN g/dL 16 3 15 4   HEMATOCRIT % 49 2 48 2   MCV fL 91 93   PLATELETS Thousands/uL 409* 430*   MCH pg 30 0 29 7   MCHC g/dL 33 1 32 0   RDW % 12 7 12 5   MPV fL 10 0 8 8*   NRBC AUTO /100 WBCs 0 0     CMP:  Results from last 7 days   Lab Units 02/25/19  0516 02/24/19  0257 19  0355   POTASSIUM mmol/L 4 0 4 1 4 4   CHLORIDE mmol/L 105 104 105   CO2 mmol/L 24 26 27   BUN mg/dL 21 20 16   CREATININE mg/dL 1 08 1 15 1 11   CALCIUM mg/dL 9 2 9 1 8 9   AST U/L  --   --  11   ALT U/L  --   --  25   ALK PHOS U/L  --   --  85   EGFR ml/min/1 73sq m 74 69 72     BMP:  Results from last 7 days   Lab Units 19  0516 19  0257 19  0355   POTASSIUM mmol/L 4 0 4 1 4 4   CHLORIDE mmol/L 105 104 105   CO2 mmol/L 24 26 27   BUN mg/dL 21 20 16   CREATININE mg/dL 1 08 1 15 1 11   CALCIUM mg/dL 9 2 9 1 8 9     Lab Results   Component Value Date    NTBNP 3,544 (H) 2019     Results from last 7 days   Lab Units 19  0257   MAGNESIUM mg/dL 2 1     Results from last 7 days   Lab Units 19  0257   HEMOGLOBIN A1C % 6 4*     Results from last 7 days   Lab Units 19  0741   INR  1 05     Lipid Profile:   No results found for: CHOL  Lab Results   Component Value Date    HDL 47 2019     Lab Results   Component Value Date    LDLCALC 68 2019     Lab Results   Component Value Date    TRIG 92 2019     Cardiac testing:   Results for orders placed during the hospital encounter of 19   Echo complete with contrast if indicated    [address]  [phone]    Transthoracic Echocardiogram  2D, M-mode, Doppler, and Color Doppler    Study date:  2019    Patient: Gino Dunbar  MR number: [id number]  Account number: [de-identified]  : 1960  Age: 59 years  Gender: Male  Status: Inpatient  Location: Bedside  Height: 69 in  Weight: 163 7 lb  BP: 112/ 84 mmHg    Indications: NSTEMI    Diagnoses: I21 4 - Non-ST elevation (NSTEMI) myocardial infarction    Sonographer:  OMER Patrick  Primary Physician:  Annette Harris DO  Referring Physician:  Pily Lazaro MD  Group:  [facility]  Interpreting Physician:  Rachel Goss MD    SUMMARY    LEFT VENTRICLE:  The ventricle was mildly dilated  Systolic function was severely reduced  Ejection fraction was estimated to be 15 %  There were no regional wall motion abnormalities  There was severe diffuse hypokinesis  LEFT ATRIUM:  The atrium was mildly to moderately dilated  MITRAL VALVE:  There was mild annular calcification  There was mild regurgitation  TRICUSPID VALVE:  There was mild regurgitation  Pulmonary artery systolic pressure was within the normal range  HISTORY: PRIOR HISTORY: HLD; Smoker; PVD; NEWBERRY; HEP C; PAD    PROCEDURE: The procedure was performed at the bedside  This was a routine study  The transthoracic approach was used  The study included complete 2D imaging, M-mode, complete spectral Doppler, and color Doppler  Echocardiographic views  were limited due to decreased penetration and lung interference  This was a technically difficult study  LEFT VENTRICLE: The ventricle was mildly dilated  Systolic function was severely reduced  Ejection fraction was estimated to be 15 %  There were no regional wall motion abnormalities  There was severe diffuse hypokinesis  Wall thickness  was normal  No evidence of apical thrombus  DOPPLER: Left ventricular diastolic function parameters were normal     RIGHT VENTRICLE: The size was normal  Systolic function was normal  Wall thickness was normal     LEFT ATRIUM: The atrium was mildly to moderately dilated  RIGHT ATRIUM: Size was normal     MITRAL VALVE: There was mild annular calcification  Valve structure was normal  There was mild thickening  There was normal leaflet separation  DOPPLER: The transmitral velocity was within the normal range  There was no evidence for  stenosis  There was mild regurgitation  AORTIC VALVE: The valve was trileaflet  Leaflets exhibited normal thickness and normal cuspal separation  DOPPLER: Transaortic velocity was within the normal range  There was no evidence for stenosis   There was no significant  regurgitation  TRICUSPID VALVE: The valve structure was normal  There was normal leaflet separation  DOPPLER: The transtricuspid velocity was within the normal range  There was no evidence for stenosis  There was mild regurgitation  Pulmonary artery  systolic pressure was within the normal range  PULMONIC VALVE: Leaflets exhibited normal thickness, no calcification, and normal cuspal separation  DOPPLER: The transpulmonic velocity was within the normal range  There was no significant regurgitation  PERICARDIUM: There was no pericardial effusion  The pericardium was normal in appearance  AORTA: The root exhibited normal size  SYSTEMIC VEINS: IVC: The inferior vena cava was normal in size      SYSTEM MEASUREMENT TABLES    2D  %FS: 9 68 %  Ao Diam: 3 07 cm  EDV(Teich): 140 29 ml  EF Biplane: 29 5 %  EF(Teich): 21 02 %  ESV(Teich): 110 8 ml  IVSd: 1 01 cm  LA Area: 27 57 cm2  LA Diam: 4 01 cm  LVEDV MOD A2C: 232 99 ml  LVEDV MOD A4C: 327 88 ml  LVEDV MOD BP: 293 43 ml  LVEF MOD A2C: 23 75 %  LVEF MOD A4C: 32 84 %  LVESV MOD A2C: 177 66 ml  LVESV MOD A4C: 220 22 ml  LVESV MOD BP: 206 88 ml  LVIDd: 5 38 cm  LVIDs: 4 86 cm  LVLd A2C: 9 4 cm  LVLd A4C: 10 61 cm  LVLs A2C: 8 39 cm  LVLs A4C: 9 2 cm  LVPWd: 1 06 cm  RA Area: 8 04 cm2  RVIDd: 2 42 cm  SV MOD A2C: 55 33 ml  SV MOD A4C: 107 66 ml  SV(Teich): 29 49 ml    CW  RAP: 10 mmHg  TR Vmax: 2 58 m/s  TR maxP 59 mmHg    MM  TAPSE: 1 2 cm    PW  E': 0 04 m/s  E/E': 20 95  MV A Rito: 0 5 m/s  MV Dec [place]: 5 94 m/s2  MV DecT: 153 2 ms  MV E Rito: 0 91 m/s  MV E/A Ratio: 1 82  MV PHT: 44 43 ms  MVA By PHT: 4 95 cm2  RVSP: 36 59 mmHg    [facility] [place] [facility] Accredited Echocardiography Laboratory    Prepared and electronically signed by    Brent Rhoades MD  Signed [id number] 15:25:39       Meds/Allergies   current meds:   Current Facility-Administered Medications   Medication Dose Route Frequency    acetaminophen (TYLENOL) tablet 650 mg 650 mg Oral Q6H PRN    aspirin (ECOTRIN LOW STRENGTH) EC tablet 81 mg  81 mg Oral Daily    aspirin chewable tablet 324 mg  324 mg Oral Once    atorvastatin (LIPITOR) tablet 40 mg  40 mg Oral After Dinner    clopidogrel (PLAVIX) tablet 75 mg  75 mg Oral Daily    cyclobenzaprine (FLEXERIL) tablet 10 mg  10 mg Oral BID PRN    diazepam (VALIUM) tablet 2 mg  2 mg Oral Q8H PRN    docusate sodium (COLACE) capsule 100 mg  100 mg Oral BID    furosemide (LASIX) injection 20 mg  20 mg Intravenous Daily    heparin (porcine) 25,000 units in 250 mL infusion (premix)  3-20 Units/kg/hr (Order-Specific) Intravenous Titrated    meloxicam (MOBIC) tablet 7 5 mg  7 5 mg Oral Daily    metoprolol tartrate (LOPRESSOR) tablet 25 mg  25 mg Oral Q12H FANY    nicotine (NICODERM CQ) 21 mg/24 hr TD 24 hr patch 1 patch  1 patch Transdermal Daily    ondansetron (ZOFRAN) injection 4 mg  4 mg Intravenous Q8H PRN    oxyCODONE-acetaminophen (PERCOCET) 5-325 mg per tablet 1 tablet  1 tablet Oral Q8H PRN    polyethylene glycol (MIRALAX) packet 17 g  17 g Oral Daily PRN    senna (SENOKOT) tablet 8 6 mg  1 tablet Oral Daily     Medications Prior to Admission   Medication    aspirin (ECOTRIN LOW STRENGTH) 81 mg EC tablet    atorvastatin (LIPITOR) 20 mg tablet    cyclobenzaprine (FLEXERIL) 10 mg tablet    diazepam (VALIUM) 5 mg tablet    meloxicam (MOBIC) 7 5 mg tablet    oxyCODONE-acetaminophen (PERCOCET) 5-325 mg per tablet    sildenafil (REVATIO) 20 mg tablet    naproxen (NAPROSYN) 500 mg tablet       heparin (porcine) 3-20 Units/kg/hr (Order-Specific) Last Rate: 20 Units/kg/hr (02/25/19 0040)     Assessment:  Principal Problem:    Dyspnea on exertion  Active Problems:    Dyslipidemia    Tobacco abuse disorder    NSTEMI (non-ST elevated myocardial infarction) (HCC)    Peripheral vascular disease (HCC)    Elevated random blood glucose level    Counseling / Coordination of Care  Total floor / unit time spent today 20 minutes  Greater than 50% of total time was spent with the patient and / or family counseling and / or coordination of care  ** Please Note: Dragon 360 Dictation voice to text software may have been used in the creation of this document   **

## 2019-02-26 ENCOUNTER — APPOINTMENT (INPATIENT)
Dept: RADIOLOGY | Facility: HOSPITAL | Age: 59
DRG: 281 | End: 2019-02-26
Payer: MEDICARE

## 2019-02-26 PROBLEM — F11.20 OPIOID DEPENDENCE (HCC): Status: ACTIVE | Noted: 2019-02-26

## 2019-02-26 PROBLEM — I42.8 NON-ISCHEMIC CARDIOMYOPATHY (HCC): Status: ACTIVE | Noted: 2019-02-26

## 2019-02-26 PROBLEM — R06.00 DYSPNEA ON EXERTION: Status: RESOLVED | Noted: 2019-02-23 | Resolved: 2019-02-26

## 2019-02-26 PROBLEM — I21.A1 TYPE 2 MYOCARDIAL INFARCTION (HCC): Status: ACTIVE | Noted: 2019-02-23

## 2019-02-26 PROBLEM — I50.21 ACUTE SYSTOLIC (CONGESTIVE) HEART FAILURE (HCC): Status: ACTIVE | Noted: 2019-02-26

## 2019-02-26 LAB
ANION GAP SERPL CALCULATED.3IONS-SCNC: 7 MMOL/L (ref 4–13)
APTT PPP: 30 SECONDS (ref 26–38)
BUN SERPL-MCNC: 24 MG/DL (ref 5–25)
CALCIUM SERPL-MCNC: 9 MG/DL (ref 8.3–10.1)
CHLORIDE SERPL-SCNC: 104 MMOL/L (ref 100–108)
CO2 SERPL-SCNC: 26 MMOL/L (ref 21–32)
CREAT SERPL-MCNC: 1.1 MG/DL (ref 0.6–1.3)
GFR SERPL CREATININE-BSD FRML MDRD: 73 ML/MIN/1.73SQ M
GLUCOSE SERPL-MCNC: 116 MG/DL (ref 65–140)
POTASSIUM SERPL-SCNC: 4.4 MMOL/L (ref 3.5–5.3)
SODIUM SERPL-SCNC: 137 MMOL/L (ref 136–145)

## 2019-02-26 PROCEDURE — A9585 GADOBUTROL INJECTION: HCPCS | Performed by: NURSE PRACTITIONER

## 2019-02-26 PROCEDURE — 99233 SBSQ HOSP IP/OBS HIGH 50: CPT | Performed by: INTERNAL MEDICINE

## 2019-02-26 PROCEDURE — 75561 CARDIAC MRI FOR MORPH W/DYE: CPT

## 2019-02-26 PROCEDURE — 80048 BASIC METABOLIC PNL TOTAL CA: CPT | Performed by: NURSE PRACTITIONER

## 2019-02-26 PROCEDURE — 85730 THROMBOPLASTIN TIME PARTIAL: CPT | Performed by: INTERNAL MEDICINE

## 2019-02-26 PROCEDURE — 99233 SBSQ HOSP IP/OBS HIGH 50: CPT | Performed by: NURSE PRACTITIONER

## 2019-02-26 RX ORDER — FUROSEMIDE 20 MG/1
20 TABLET ORAL DAILY
Status: DISCONTINUED | OUTPATIENT
Start: 2019-02-27 | End: 2019-02-27 | Stop reason: HOSPADM

## 2019-02-26 RX ADMIN — DOCUSATE SODIUM 100 MG: 100 CAPSULE, LIQUID FILLED ORAL at 17:25

## 2019-02-26 RX ADMIN — SACUBITRIL AND VALSARTAN 1 TABLET: 24; 26 TABLET, FILM COATED ORAL at 13:18

## 2019-02-26 RX ADMIN — FUROSEMIDE 20 MG: 10 INJECTION, SOLUTION INTRAVENOUS at 08:52

## 2019-02-26 RX ADMIN — GADOBUTROL 14 ML: 604.72 INJECTION INTRAVENOUS at 03:04

## 2019-02-26 RX ADMIN — STANDARDIZED SENNA CONCENTRATE 8.6 MG: 8.6 TABLET ORAL at 08:51

## 2019-02-26 RX ADMIN — DOCUSATE SODIUM 100 MG: 100 CAPSULE, LIQUID FILLED ORAL at 08:51

## 2019-02-26 RX ADMIN — ASPIRIN 81 MG: 81 TABLET, COATED ORAL at 08:51

## 2019-02-26 RX ADMIN — SACUBITRIL AND VALSARTAN 1 TABLET: 24; 26 TABLET, FILM COATED ORAL at 22:40

## 2019-02-26 RX ADMIN — METOPROLOL SUCCINATE 50 MG: 50 TABLET, EXTENDED RELEASE ORAL at 08:51

## 2019-02-26 RX ADMIN — MELOXICAM 7.5 MG: 7.5 TABLET ORAL at 08:54

## 2019-02-26 RX ADMIN — ATORVASTATIN CALCIUM 40 MG: 40 TABLET, FILM COATED ORAL at 17:25

## 2019-02-26 NOTE — MEDICAL STUDENT
Progress Note - Stroke   Luis Eduardo Linn 59 y o  male MRN: [id number]  Unit/Bed#: [facility] 427-01 Encounter: [id number]    Assessment:  Luis Eduardo Linn is a 60 yo M w/PMHx PAD, and 1 PPD hx who presented to the ED for evaluation of his worsening orthopnea  On arrival, he had an elevated trop to   11 and pro BNP of 3544 with ST depressions and vascular congestion on CXR  He was given 20 mg IV lasix and admitted for additional ACS work-up  Dispo:   PT/OT: pending   CM: LifeVest fitting today    Specialities: cardiology    Possible D/C tomorrow      Code: Level 1, Full    Dyspnea on Exertion   - Likely 2/2 CHF, NSTEMI - see below    NSTEMI   - ST depressions in V5-6 on ED EKG w/trop to 5 56    - Currently stable   - Continue ASA, Statin    - Cardiology onboard: see below plan     Acute Systolic CHF   - 0/57 Echo w/15% EF   - 2/26 Cardiac MRI w/several dilated LV, thin intramyocardial fibrosis @ mid IV septum, suggestive non-ischemic CM (viral?)     - 2/25 Cath: no obstructive disease   - Currently Euvolemic    - Cardiology onboard:    - LifeVest ordered - fitting today     - Continue 20 mg IV lasix w/plan to switch to 20 mg PO tomorrow     - Continue Toprol-XL 50 mg QD     - Will start Entresto or ACEi given elevated BP       HTN   - Elevated today -   - See above plan per cardiology      Hx Hep C   - Rx'd with Anya Kramer in past     Elevated Random Blood Glucose   - AIC @ 6 4   - No diabetes diagnosis    - Continue to monitor   HLD   - Continue atorvastatin 40 mg QD     PVD   - OP w/Vasc Surgery - OP procedure planned for this week cancelled    - Continue ASA, statin     Tobacco Use    - Continue nicoderm CQ     Subjective/Objective     Subjective: Mr Yossi Mathis is in good spirits today  No SOB  He is aware of the LifeVest plan and in agreement with fitting  ROS:  Negative except NONE     Vitals: Blood pressure 127/84, pulse 80, temperature 98 °F (36 7 °C), temperature source Oral, resp   rate 18, height 5' 9" (1 753 m), weight 74 9 kg (165 lb 3 2 oz), SpO2 94 %  ,Body mass index is 24 4 kg/m²  Invasive Devices     Peripheral Intravenous Line            Peripheral IV 02/23/19 Right Antecubital 3 days    Peripheral IV 02/23/19 Right Forearm 3 days                Physical Exam: /83 (BP Location: Left arm) Comment: Map 99  Pulse 85   Temp (!) 97 4 °F (36 3 °C) (Oral)   Resp 18   Ht 5' 9" (1 753 m)   Wt 74 9 kg (165 lb 3 2 oz)   SpO2 99%   BMI 24 40 kg/m²     General Appearance:    Alert, cooperative, no distress, appears stated age   Head:    Normocephalic, without obvious abnormality, atraumatic   Eyes:    PERRL, conjunctiva/corneas clear, EOM's intact, fundi     benign, both eyes        Ears:    Normal TM's and external ear canals, both ears   Nose:   Nares normal, septum midline, mucosa normal, no drainage    or sinus tenderness   Throat:   Lips, mucosa, and tongue normal; teeth and gums normal   Neck:   Supple, symmetrical, trachea midline, no adenopathy;        thyroid:  No enlargement/tenderness/nodules; no carotid    bruit or JVD   Back:     Symmetric, no curvature, ROM normal, no CVA tenderness   Lungs:     Clear to auscultation bilaterally, respirations unlabored   Chest wall:    No tenderness or deformity   Heart:    Regular rate and rhythm, S1 and S2 normal, no murmur, rub   or gallop  Muffled heart sounds  Abdomen:     Soft, non-tender, bowel sounds active all four quadrants,     no masses, no organomegaly   Genitalia:    Normal male without lesion, discharge or tenderness   Rectal:    Normal tone, normal prostate, no masses or tenderness;    guaiac negative stool   Extremities:   Extremities normal, atraumatic, no cyanosis or edema   Pulses:   2+ and symmetric all extremities   Skin:   Skin color, texture, turgor normal, no rashes or lesions   Lymph nodes:   Cervical, supraclavicular, and axillary nodes normal   Neurologic:   CNII-XII intact   Normal strength, sensation and reflexes       throughout Lab, Imaging and other studies:   CBC:   Results from last 7 days   Lab Units 02/25/19  0516 02/23/19  0354   WBC Thousand/uL 14 19* 10 33*   RBC Million/uL 5 43 5 18   HEMOGLOBIN g/dL 16 3 15 4   HEMATOCRIT % 49 2 48 2   MCV fL 91 93   PLATELETS Thousands/uL 409* 430*   , BMP/CMP:   Results from last 7 days   Lab Units 02/26/19  0503 02/25/19  0516 02/24/19  0257 02/23/19  0355   SODIUM mmol/L 137 137 138 139   POTASSIUM mmol/L 4 4 4 0 4 1 4 4   CHLORIDE mmol/L 104 105 104 105   CO2 mmol/L 26 24 26 27   BUN mg/dL 24 21 20 16   CREATININE mg/dL 1 10 1 08 1 15 1 11   CALCIUM mg/dL 9 0 9 2 9 1 8 9   AST U/L  --   --   --  11   ALT U/L  --   --   --  25   ALK PHOS U/L  --   --   --  85   EGFR ml/min/1 73sq m 73 74 69 72     VTE Prophylaxis: Sequential compression device (Venodyne)       Counseling / Coordination of Care  Total time spent today 45 minutes  Greater than 50% of total time was spent with the patient and / or family counseling and / or coordination of care   A description of the counseling / coordination of care: 45

## 2019-02-26 NOTE — PROGRESS NOTES
General Cardiology   Progress Note -  [facility] One   Lana Lemus 59 y o  male MRN: [id number]    Unit/Bed#: [facility] 427-01 Encounter: [id number]    Assessment/ Plan :    Non-ischemic cardiomyopathy: MRI last night showed severely dilated LV with EF 20%, thin strip of intramyocardial fibrosis suggestive of idiopathic, non-ischemic cardiomyopathy  Pt did have respiratory illness about 6 weeks ago; tx with abx   - Started on metoprolol succinate, BP higher today; will start either ACE or  Entresto; will d/w attending     - Euvolemic on exam, changing to PO diuretics today   - Agreeable to LifeVest on discharge; CM working on arranging  Repeat echo as OP    Acute systolic congestive heart failure: Has diuresed well on lasix 20mg IV daily   - negative 2 3L   - K and Cr stable  He is euvolemic on exam today; able to lay flat and ambulate halls with dyspnea  Will change lasix to 20mg PO daily starting tomorrow  Consumes a high Na diet at home, low Na diet teaching done, will consult nutritionist for further reinforcement  He will purchase a scale for daily weights at home  Type II MI: 2/2 acute CHF and new cardiomyopathy  Troponin peak 10 with characteristic rise and fall; cardiac cath yesterday with non-obstructive disease  Remains chest pain free  Continue ASA, statin and BB  Discussed importance of smoking cessation       Hx of Hep C: completed treatment with [facility]       Tobacco abuse: strongly advised cessation; nicotine patch       PVD with claudication: f/b vascular surgery as OP; appears he was scheduled for procedure this week but will likely need to be canceled  F/u OP  On ASA/statin    Subjective: Pt seen for follow up, no events overnight  He is feeling well today  No longer SOB but has not been walking in halls yet  No recurrent chest or shoulder pain  Had cardiac MRI last night  Review of Systems   Constitution: Negative for decreased appetite and fever     Cardiovascular: Negative for chest pain, dyspnea on exertion, leg swelling, palpitations and syncope  Respiratory: Negative for cough, shortness of breath and wheezing  Gastrointestinal: Negative for bloating, abdominal pain, nausea and vomiting  Genitourinary: Negative for dysuria  Neurological: Negative for dizziness and light-headedness  Psychiatric/Behavioral: Negative for altered mental status  Objective:   Vitals: Blood pressure 127/84, pulse 80, temperature 98 °F (36 7 °C), temperature source Oral, resp  rate 18, height 5' 9" (1 753 m), weight 74 9 kg (165 lb 3 2 oz), SpO2 94 %  ,     Body mass index is 24 4 kg/m²  ,     Systolic (05RTD), OLT:227 , Min:95 , MICHELLE:399     Diastolic (19UND), ZQX:47, Min:61, Max:89      Intake/Output Summary (Last 24 hours) at 2/26/2019 0924  Last data filed at 2/26/2019 0701  Gross per 24 hour   Intake 1080 ml   Output 1965 ml   Net -885 ml     Weight (last 2 days)     Date/Time   Weight    02/26/19 0600   74 9 (165 2)    02/25/19 0600   75 1 (165 5)    02/24/19 0600   74 8 (164 9)            Telemetry Review: No significant arrhythmias seen on telemetry review  Sinus rhythm, no significant events    Physical Exam   Constitutional: He is oriented to person, place, and time  No distress  Pt laying nearly flat in bed in NAD   Neck: No JVD present  Cardiovascular: Normal rate, regular rhythm, S1 normal and S2 normal    No murmur heard  No LE edema  Right radial cath site wnl,   Pulmonary/Chest: Effort normal and breath sounds normal  No respiratory distress  He has no rales  BS CTA, on RA, laying flat in bed speaking in full sentences   Abdominal: Soft  Neurological: He is alert and oriented to person, place, and time  Skin: Skin is warm and dry  He is not diaphoretic  Psychiatric: He has a normal mood and affect  His behavior is normal    Nursing note and vitals reviewed      LABORATORY RESULTS  Results from last 7 days   Lab Units 02/24/19  0257 02/23/19  2331 02/23/19 2026   TROPONIN I ng/mL 8 39* 10 40* 10 20*     CBC with diff:   Results from last 7 days   Lab Units 02/25/19  0516 02/23/19  0354   WBC Thousand/uL 14 19* 10 33*   HEMOGLOBIN g/dL 16 3 15 4   HEMATOCRIT % 49 2 48 2   MCV fL 91 93   PLATELETS Thousands/uL 409* 430*   MCH pg 30 0 29 7   MCHC g/dL 33 1 32 0   RDW % 12 7 12 5   MPV fL 10 0 8 8*   NRBC AUTO /100 WBCs 0 0     CMP:  Results from last 7 days   Lab Units 02/26/19  0503 02/25/19  0516 02/24/19  0257 02/23/19  0355   POTASSIUM mmol/L 4 4 4 0 4 1 4 4   CHLORIDE mmol/L 104 105 104 105   CO2 mmol/L 26 24 26 27   BUN mg/dL 24 21 20 16   CREATININE mg/dL 1 10 1 08 1 15 1 11   CALCIUM mg/dL 9 0 9 2 9 1 8 9   AST U/L  --   --   --  11   ALT U/L  --   --   --  25   ALK PHOS U/L  --   --   --  85   EGFR ml/min/1 73sq m 73 74 69 72     BMP:  Results from last 7 days   Lab Units 02/26/19  0503 02/25/19  0516 02/24/19  0257 02/23/19  0355   POTASSIUM mmol/L 4 4 4 0 4 1 4 4   CHLORIDE mmol/L 104 105 104 105   CO2 mmol/L 26 24 26 27   BUN mg/dL 24 21 20 16   CREATININE mg/dL 1 10 1 08 1 15 1 11   CALCIUM mg/dL 9 0 9 2 9 1 8 9     Lab Results   Component Value Date    NTBNP 3,544 (H) 02/23/2019     Results from last 7 days   Lab Units 02/24/19  0257   MAGNESIUM mg/dL 2 1     Results from last 7 days   Lab Units 02/24/19  0257   HEMOGLOBIN A1C % 6 4*     Results from last 7 days   Lab Units 02/23/19  0741   INR  1 05     Lipid Profile:   No results found for: CHOL  Lab Results   Component Value Date    HDL 47 02/24/2019     Lab Results   Component Value Date    LDLCALC 68 02/24/2019     Lab Results   Component Value Date    TRIG 92 02/24/2019     Cardiac testing:   Results for orders placed during the hospital encounter of 02/23/19   Echo complete with contrast if indicated    Narrative [place] [address]  [address]  [place], [address]  [phone]    Transthoracic Echocardiogram  2D, M-mode, Doppler, and Color Doppler    Study date:  24-Feb-2019    Patient: Rick Galarza  MR number: [id number]  Account number: [de-identified]  : 1960  Age: 59 years  Gender: Male  Status: Inpatient  Location: Bedside  Height: 69 in  Weight: 163 7 lb  BP: 112/ 84 mmHg    Indications: NSTEMI    Diagnoses: I21 4 - Non-ST elevation (NSTEMI) myocardial infarction    Sonographer:  OMER Lassiter  Primary Physician:  Lanre Gaming DO  Referring Physician:  Colette Bazzi MD  Group:  [facility]  Interpreting Physician:  Darryll Mcardle, MD    SUMMARY    LEFT VENTRICLE:  The ventricle was mildly dilated  Systolic function was severely reduced  Ejection fraction was estimated to be 15 %  There were no regional wall motion abnormalities  There was severe diffuse hypokinesis  LEFT ATRIUM:  The atrium was mildly to moderately dilated  MITRAL VALVE:  There was mild annular calcification  There was mild regurgitation  TRICUSPID VALVE:  There was mild regurgitation  Pulmonary artery systolic pressure was within the normal range  HISTORY: PRIOR HISTORY: HLD; Smoker; PVD; NEWBERRY; HEP C; PAD    PROCEDURE: The procedure was performed at the bedside  This was a routine study  The transthoracic approach was used  The study included complete 2D imaging, M-mode, complete spectral Doppler, and color Doppler  Echocardiographic views  were limited due to decreased penetration and lung interference  This was a technically difficult study  LEFT VENTRICLE: The ventricle was mildly dilated  Systolic function was severely reduced  Ejection fraction was estimated to be 15 %  There were no regional wall motion abnormalities  There was severe diffuse hypokinesis  Wall thickness  was normal  No evidence of apical thrombus  DOPPLER: Left ventricular diastolic function parameters were normal     RIGHT VENTRICLE: The size was normal  Systolic function was normal  Wall thickness was normal     LEFT ATRIUM: The atrium was mildly to moderately dilated      RIGHT ATRIUM: Size was normal     MITRAL VALVE: There was mild annular calcification  Valve structure was normal  There was mild thickening  There was normal leaflet separation  DOPPLER: The transmitral velocity was within the normal range  There was no evidence for  stenosis  There was mild regurgitation  AORTIC VALVE: The valve was trileaflet  Leaflets exhibited normal thickness and normal cuspal separation  DOPPLER: Transaortic velocity was within the normal range  There was no evidence for stenosis  There was no significant  regurgitation  TRICUSPID VALVE: The valve structure was normal  There was normal leaflet separation  DOPPLER: The transtricuspid velocity was within the normal range  There was no evidence for stenosis  There was mild regurgitation  Pulmonary artery  systolic pressure was within the normal range  PULMONIC VALVE: Leaflets exhibited normal thickness, no calcification, and normal cuspal separation  DOPPLER: The transpulmonic velocity was within the normal range  There was no significant regurgitation  PERICARDIUM: There was no pericardial effusion  The pericardium was normal in appearance  AORTA: The root exhibited normal size  SYSTEMIC VEINS: IVC: The inferior vena cava was normal in size      SYSTEM MEASUREMENT TABLES    2D  %FS: 9 68 %  Ao Diam: 3 07 cm  EDV(Teich): 140 29 ml  EF Biplane: 29 5 %  EF(Teich): 21 02 %  ESV(Teich): 110 8 ml  IVSd: 1 01 cm  LA Area: 27 57 cm2  LA Diam: 4 01 cm  LVEDV MOD A2C: 232 99 ml  LVEDV MOD A4C: 327 88 ml  LVEDV MOD BP: 293 43 ml  LVEF MOD A2C: 23 75 %  LVEF MOD A4C: 32 84 %  LVESV MOD A2C: 177 66 ml  LVESV MOD A4C: 220 22 ml  LVESV MOD BP: 206 88 ml  LVIDd: 5 38 cm  LVIDs: 4 86 cm  LVLd A2C: 9 4 cm  LVLd A4C: 10 61 cm  LVLs A2C: 8 39 cm  LVLs A4C: 9 2 cm  LVPWd: 1 06 cm  RA Area: 8 04 cm2  RVIDd: 2 42 cm  SV MOD A2C: 55 33 ml  SV MOD A4C: 107 66 ml  SV(Teich): 29 49 ml    CW  RAP: 10 mmHg  TR Vmax: 2 58 m/s  TR maxP 59 mmHg    MM  TAPSE: 1 2 cm    PW  E': 0 04 m/s  E/E': 20 95  MV A Rito: 0 5 m/s  MV Dec [place]: 5 94 m/s2  MV DecT: 153 2 ms  MV E Rito: 0 91 m/s  MV E/A Ratio: 1 82  MV PHT: 44 43 ms  MVA By PHT: 4 95 cm2  RVSP: 36 59 mmHg    [facility]    Prepared and electronically signed by    Korey De León MD  Signed [id number] 15:25:39       Meds/Allergies   current meds:   Current Facility-Administered Medications   Medication Dose Route Frequency    acetaminophen (TYLENOL) tablet 650 mg  650 mg Oral Q6H PRN    aspirin (ECOTRIN LOW STRENGTH) EC tablet 81 mg  81 mg Oral Daily    atorvastatin (LIPITOR) tablet 40 mg  40 mg Oral After Dinner    cyclobenzaprine (FLEXERIL) tablet 10 mg  10 mg Oral BID PRN    diazepam (VALIUM) tablet 2 mg  2 mg Oral Q8H PRN    docusate sodium (COLACE) capsule 100 mg  100 mg Oral BID    [START ON 2/27/2019] furosemide (LASIX) tablet 20 mg  20 mg Oral Daily    meloxicam (MOBIC) tablet 7 5 mg  7 5 mg Oral Daily    metoprolol succinate (TOPROL-XL) 24 hr tablet 50 mg  50 mg Oral Daily    nicotine (NICODERM CQ) 21 mg/24 hr TD 24 hr patch 1 patch  1 patch Transdermal Daily    ondansetron (ZOFRAN) injection 4 mg  4 mg Intravenous Q8H PRN    oxyCODONE-acetaminophen (PERCOCET) 5-325 mg per tablet 1 tablet  1 tablet Oral Q8H PRN    polyethylene glycol (MIRALAX) packet 17 g  17 g Oral Daily PRN    senna (SENOKOT) tablet 8 6 mg  1 tablet Oral Daily     Medications Prior to Admission   Medication    aspirin (ECOTRIN LOW STRENGTH) 81 mg EC tablet    atorvastatin (LIPITOR) 20 mg tablet    cyclobenzaprine (FLEXERIL) 10 mg tablet    diazepam (VALIUM) 5 mg tablet    meloxicam (MOBIC) 7 5 mg tablet    oxyCODONE-acetaminophen (PERCOCET) 5-325 mg per tablet    sildenafil (REVATIO) 20 mg tablet    naproxen (NAPROSYN) 500 mg tablet     Assessment:  Principal Problem:    Dyspnea on exertion  Active Problems:    Dyslipidemia    Tobacco abuse disorder    [facility]non[facility]ST elevated [facility]) [facility]    Peripheral vascular disease ([facility] )    Elevated random blood glucose level    Counseling / Coordination of Care  Total floor / unit time spent today 20 minutes  Greater than 50% of total time was spent with the patient and / or family counseling and / or coordination of care  ** Please Note: Dragon 360 Dictation voice to text software may have been used in the creation of this document   **

## 2019-02-26 NOTE — ASSESSMENT & PLAN NOTE
· Encourage cessation    · He does not think he needs nicotine patch    · Pt reports he doesn't smoke anymore

## 2019-02-26 NOTE — PROGRESS NOTES
Progress Note Jing Lira 1960, 59 y o  male MRN: [id number]    Unit/Bed#: [facility] 427-01 Encounter: [id number]    Primary Care Provider: Janna Pardo DO   Date and time admitted to hospital: 2/23/2019  3:45 AM        Opioid dependence ([facility] )  Assessment & Plan  Chronic opioid dependene, due to chronic pain in pt with PAD requiring percocet at home and pain management  - continue home regimen     Acute systolic (congestive) heart failure (HCC)  Assessment & Plan  Echocardiogram shows severe dilated left ventricle, myocarditis possibly related to viral in nature, EF of 15%  He is started on ENTRESTO  24-26 MG  B i d , metoprolol succinate 50 mg daily  ENTRESTO requires preauth, cardiology is working on it  Elevated random blood glucose level  Assessment & Plan  · A1c  6 4  · No prior history of diabetes  · Place him on sliding scale    Peripheral vascular disease [facility])  Assessment & Plan  · Patient follows with vascular surgeon  · Continue aspirin  · Statin increased to 40 mg daily  Type 2 myocardial infarction [facility])  Assessment & Plan  · Trending upwards (0 11 -> 0 40 -> 1 51 -> 5 56)  · continued on heparin drip  · Loaded with Plavix  · Started on ASA, beta-blocker and statin  · Cardiology following and appreciate their input  · Cardiac cath planned for  About 4pm today   Cardiology suspects multivessel disease  Tobacco abuse disorder  Assessment & Plan  · Encourage cessation    · He does not think he needs nicotine patch    · Pt reports he doesn't smoke anymore     Dyslipidemia  Assessment & Plan  Low-cholesterol diet   Check fasting lipid panel wnl     * Non-ischemic cardiomyopathy (HCC)  Assessment & Plan  ST depressions noted in  0 11 -> 0 40 -> 1 51 -> 5 56  ECHO: 15% EF, viral cardiomyopathy possible  He will need life vest, placed today  Cardiology will be adjusting medications for him  Possible discharge tomorrow                VTE Pharmacologic Prophylaxis:   Pharmacologic: Enoxaparin (Lovenox)  Mechanical VTE Prophylaxis in Place: Yes    Patient Centered Rounds: I have performed bedside rounds with nursing staff today  Discussions with Specialists or Other Care Team Provider: cardiology    Education and Discussions with Family / Patient: patient    Time Spent for Care: 45 minutes  More than 50% of total time spent on counseling and coordination of care as described above  Current Length of Stay: 3 day(s)    Current Patient Status: Inpatient   Certification Statement: The patient will continue to require additional inpatient hospital stay due to cardiomyopathy    Discharge Plan:  tomorrow    Code Status: Level 1 - Full Code      Subjective:   I am doing well, I just need to get out of here  No acute events overnight  Concerned about life vest     Objective:     Vitals:   Temp (24hrs), Av °F (36 7 °C), Min:97 4 °F (36 3 °C), Max:98 4 °F (36 9 °C)    Temp:  [97 4 °F (36 3 °C)-98 4 °F (36 9 °C)] 97 9 °F (36 6 °C)  HR:  [75-91] 85  Resp:  [18] 18  BP: (105-132)/(61-84) 113/79  SpO2:  [92 %-99 %] 92 %  Body mass index is 24 4 kg/m²  Input and Output Summary (last 24 hours): Intake/Output Summary (Last 24 hours) at 2019 1705  Last data filed at 2019 1605  Gross per 24 hour   Intake 1620 ml   Output 1965 ml   Net -345 ml       Physical Exam:     Physical Exam   Constitutional: He is oriented to person, place, and time  He appears well-developed and well-nourished  No distress  HENT:   Head: Normocephalic and atraumatic  Right Ear: External ear normal    Left Ear: External ear normal    Mouth/Throat: Oropharynx is clear and moist  No oropharyngeal exudate  Eyes: Pupils are equal, round, and reactive to light  EOM are normal  Right eye exhibits no discharge  Left eye exhibits no discharge  No scleral icterus  Neck: Normal range of motion  Neck supple  No JVD present  No tracheal deviation present  No thyromegaly present  Cardiovascular: Normal rate  No murmur heard  Pulmonary/Chest: Effort normal and breath sounds normal    Abdominal: Soft  Bowel sounds are normal  He exhibits no distension and no mass  There is no tenderness  There is no rebound and no guarding  No hernia  Musculoskeletal: Normal range of motion  He exhibits no edema  Lymphadenopathy:     He has no cervical adenopathy  Neurological: He is alert and oriented to person, place, and time  A cranial nerve deficit is present  Skin: Skin is warm and dry  He is not diaphoretic  Psychiatric: He has a normal mood and affect  Nursing note and vitals reviewed  Additional Data:     Labs:    Results from last 7 days   Lab Units 02/25/19  0516   WBC Thousand/uL 14 19*   HEMOGLOBIN g/dL 16 3   HEMATOCRIT % 49 2   PLATELETS Thousands/uL 409*   NEUTROS PCT % 61   LYMPHS PCT % 27   MONOS PCT % 8   EOS PCT % 3     Results from last 7 days   Lab Units 02/26/19  0503  02/23/19  0355   POTASSIUM mmol/L 4 4   < > 4 4   CHLORIDE mmol/L 104   < > 105   CO2 mmol/L 26   < > 27   BUN mg/dL 24   < > 16   CREATININE mg/dL 1 10   < > 1 11   CALCIUM mg/dL 9 0   < > 8 9   ALK PHOS U/L  --   --  85   ALT U/L  --   --  25   AST U/L  --   --  11    < > = values in this interval not displayed  Results from last 7 days   Lab Units 02/23/19  0741   INR  1 05       * I Have Reviewed All Lab Data Listed Above  * Additional Pertinent Lab Tests Reviewed:  All Labs Within Last 24 Hours Reviewed    Imaging:    Imaging Reports Reviewed Today Include:    Imaging Personally Reviewed by Myself Includes:      Recent Cultures (last 7 days):           Last 24 Hours Medication List:     Current Facility-Administered Medications:  acetaminophen 650 mg Oral Q6H PRN Radha Francis MD   aspirin 81 mg Oral Daily Radha Francis MD   atorvastatin 40 mg Oral After Damari Alvarado MD   cyclobenzaprine 10 mg Oral BID PRN Radha Francis MD   diazepam 2 mg Oral Q8H PRN Radha Francis MD   docusate sodium 100 mg Oral BID Mason Pelayo MD   [START ON 2/27/2019] furosemide 20 mg Oral Daily JAME Limon   meloxicam 7 5 mg Oral Daily Mason Pelayo MD   metoprolol succinate 50 mg Oral Daily JAME Limon   nicotine 1 patch Transdermal Daily Mason Pelayo MD   ondansetron 4 mg Intravenous Q8H PRN Mason Pelayo MD   oxyCODONE-acetaminophen 1 tablet Oral Q8H PRN Mason Pelayo MD   polyethylene glycol 17 g Oral Daily PRN Mason Pelayo MD   sacubitril-valsartan 1 tablet Oral BID JAME Limon   senna 1 tablet Oral Daily Mason Pelayo MD        Today, Patient Was Seen By: Patience Mccoy MD    ** Please Note: Dictation voice to text software may have been used in the creation of this document   **

## 2019-02-26 NOTE — ASSESSMENT & PLAN NOTE
Chronic opioid dependene, due to chronic pain in pt with PAD requiring percocet at home and pain management    - continue home regimen

## 2019-02-26 NOTE — ASSESSMENT & PLAN NOTE
Echocardiogram shows severe dilated left ventricle, myocarditis possibly related to viral in nature, EF of 15%  He is started on ENTRESTO  24-26 MG  B i d , metoprolol succinate 50 mg daily  ENTRESTO requires preauth, cardiology is working on it

## 2019-02-26 NOTE — SOCIAL WORK
Heart Failure Care Coordinator Note  Met with patient to provide HF education and identify barriers for HF management at home  HF Booklet: given and reviewed  Good engagement      Patient has Scale no  Patient has BP Cuff  no  Scale given:yes  BP Cuff given:yes

## 2019-02-26 NOTE — ASSESSMENT & PLAN NOTE
ST depressions noted in  0 11 -> 0 40 -> 1 51 -> 5 56  ECHO: 15% EF, viral cardiomyopathy possible  He will need life vest, placed today  Cardiology will be adjusting medications for him  Possible discharge tomorrow

## 2019-02-27 VITALS
HEIGHT: 69 IN | TEMPERATURE: 97.5 F | SYSTOLIC BLOOD PRESSURE: 102 MMHG | BODY MASS INDEX: 24.32 KG/M2 | HEART RATE: 85 BPM | OXYGEN SATURATION: 95 % | WEIGHT: 164.2 LBS | RESPIRATION RATE: 18 BRPM | DIASTOLIC BLOOD PRESSURE: 70 MMHG

## 2019-02-27 DIAGNOSIS — I42.8 NON-ISCHEMIC CARDIOMYOPATHY (HCC): ICD-10-CM

## 2019-02-27 DIAGNOSIS — I50.21 ACUTE SYSTOLIC (CONGESTIVE) HEART FAILURE (HCC): ICD-10-CM

## 2019-02-27 LAB
ANION GAP SERPL CALCULATED.3IONS-SCNC: 5 MMOL/L (ref 4–13)
BASOPHILS # BLD AUTO: 0.05 THOUSANDS/ΜL (ref 0–0.1)
BASOPHILS NFR BLD AUTO: 0 % (ref 0–1)
BUN SERPL-MCNC: 24 MG/DL (ref 5–25)
CALCIUM SERPL-MCNC: 9.2 MG/DL (ref 8.3–10.1)
CHLORIDE SERPL-SCNC: 106 MMOL/L (ref 100–108)
CO2 SERPL-SCNC: 25 MMOL/L (ref 21–32)
CREAT SERPL-MCNC: 1.12 MG/DL (ref 0.6–1.3)
EOSINOPHIL # BLD AUTO: 0.32 THOUSAND/ΜL (ref 0–0.61)
EOSINOPHIL NFR BLD AUTO: 3 % (ref 0–6)
ERYTHROCYTE [DISTWIDTH] IN BLOOD BY AUTOMATED COUNT: 12.6 % (ref 11.6–15.1)
GFR SERPL CREATININE-BSD FRML MDRD: 71 ML/MIN/1.73SQ M
GLUCOSE SERPL-MCNC: 127 MG/DL (ref 65–140)
HCT VFR BLD AUTO: 48.9 % (ref 36.5–49.3)
HGB BLD-MCNC: 16.3 G/DL (ref 12–17)
IMM GRANULOCYTES # BLD AUTO: 0.04 THOUSAND/UL (ref 0–0.2)
IMM GRANULOCYTES NFR BLD AUTO: 0 % (ref 0–2)
LYMPHOCYTES # BLD AUTO: 2.74 THOUSANDS/ΜL (ref 0.6–4.47)
LYMPHOCYTES NFR BLD AUTO: 22 % (ref 14–44)
MCH RBC QN AUTO: 30.2 PG (ref 26.8–34.3)
MCHC RBC AUTO-ENTMCNC: 33.3 G/DL (ref 31.4–37.4)
MCV RBC AUTO: 91 FL (ref 82–98)
MONOCYTES # BLD AUTO: 0.92 THOUSAND/ΜL (ref 0.17–1.22)
MONOCYTES NFR BLD AUTO: 7 % (ref 4–12)
NEUTROPHILS # BLD AUTO: 8.45 THOUSANDS/ΜL (ref 1.85–7.62)
NEUTS SEG NFR BLD AUTO: 68 % (ref 43–75)
NRBC BLD AUTO-RTO: 0 /100 WBCS
PLATELET # BLD AUTO: 375 THOUSANDS/UL (ref 149–390)
PMV BLD AUTO: 9.1 FL (ref 8.9–12.7)
POTASSIUM SERPL-SCNC: 4.5 MMOL/L (ref 3.5–5.3)
RBC # BLD AUTO: 5.4 MILLION/UL (ref 3.88–5.62)
SODIUM SERPL-SCNC: 136 MMOL/L (ref 136–145)
WBC # BLD AUTO: 12.52 THOUSAND/UL (ref 4.31–10.16)

## 2019-02-27 PROCEDURE — 99239 HOSP IP/OBS DSCHRG MGMT >30: CPT | Performed by: INTERNAL MEDICINE

## 2019-02-27 PROCEDURE — 80048 BASIC METABOLIC PNL TOTAL CA: CPT | Performed by: NURSE PRACTITIONER

## 2019-02-27 PROCEDURE — 99233 SBSQ HOSP IP/OBS HIGH 50: CPT | Performed by: NURSE PRACTITIONER

## 2019-02-27 PROCEDURE — 85025 COMPLETE CBC W/AUTO DIFF WBC: CPT | Performed by: INTERNAL MEDICINE

## 2019-02-27 RX ORDER — ATORVASTATIN CALCIUM 40 MG/1
40 TABLET, FILM COATED ORAL
Qty: 30 TABLET | Refills: 0 | Status: SHIPPED | OUTPATIENT
Start: 2019-02-27

## 2019-02-27 RX ORDER — POLYETHYLENE GLYCOL 3350 17 G/17G
17 POWDER, FOR SOLUTION ORAL DAILY
Qty: 14 EACH | Refills: 0 | Status: SHIPPED | OUTPATIENT
Start: 2019-02-27

## 2019-02-27 RX ORDER — MELOXICAM 7.5 MG/1
7.5 TABLET ORAL DAILY
Qty: 30 TABLET | Refills: 0 | Status: SHIPPED | OUTPATIENT
Start: 2019-02-28

## 2019-02-27 RX ORDER — SENNOSIDES 8.6 MG
1 TABLET ORAL DAILY
Qty: 120 EACH | Refills: 0 | Status: SHIPPED | OUTPATIENT
Start: 2019-02-28

## 2019-02-27 RX ORDER — METOPROLOL SUCCINATE 50 MG/1
50 TABLET, EXTENDED RELEASE ORAL DAILY
Qty: 30 TABLET | Refills: 0 | Status: SHIPPED | OUTPATIENT
Start: 2019-02-28 | End: 2019-04-05 | Stop reason: SDUPTHER

## 2019-02-27 RX ORDER — OXYCODONE HYDROCHLORIDE AND ACETAMINOPHEN 5; 325 MG/1; MG/1
1 TABLET ORAL EVERY 8 HOURS PRN
Qty: 30 TABLET | Refills: 0 | Status: SHIPPED | OUTPATIENT
Start: 2019-02-27 | End: 2019-03-09

## 2019-02-27 RX ORDER — DIAZEPAM 2 MG/1
2 TABLET ORAL EVERY 8 HOURS PRN
Qty: 30 TABLET | Refills: 0 | Status: SHIPPED | OUTPATIENT
Start: 2019-02-27

## 2019-02-27 RX ORDER — FUROSEMIDE 20 MG/1
20 TABLET ORAL DAILY
Qty: 30 TABLET | Refills: 0 | Status: SHIPPED | OUTPATIENT
Start: 2019-02-28 | End: 2019-04-05 | Stop reason: SDUPTHER

## 2019-02-27 RX ADMIN — STANDARDIZED SENNA CONCENTRATE 8.6 MG: 8.6 TABLET ORAL at 09:11

## 2019-02-27 RX ADMIN — MELOXICAM 7.5 MG: 7.5 TABLET ORAL at 09:11

## 2019-02-27 RX ADMIN — ASPIRIN 81 MG: 81 TABLET, COATED ORAL at 09:11

## 2019-02-27 RX ADMIN — ENOXAPARIN SODIUM 40 MG: 40 INJECTION SUBCUTANEOUS at 09:12

## 2019-02-27 RX ADMIN — FUROSEMIDE 20 MG: 20 TABLET ORAL at 09:11

## 2019-02-27 RX ADMIN — DOCUSATE SODIUM 100 MG: 100 CAPSULE, LIQUID FILLED ORAL at 09:11

## 2019-02-27 RX ADMIN — METOPROLOL SUCCINATE 50 MG: 50 TABLET, EXTENDED RELEASE ORAL at 09:11

## 2019-02-27 RX ADMIN — SACUBITRIL AND VALSARTAN 1 TABLET: 24; 26 TABLET, FILM COATED ORAL at 11:48

## 2019-02-27 NOTE — TREATMENT PLAN
Discussed with [facility] office, they are working on prior authorization for interested  There samples available at our office and patient is agreeable to  after discharge  He will drop by the within of samples until his next appointment on 3/7 in can further discuss prior authorization

## 2019-02-27 NOTE — MEDICAL STUDENT
Progress Note - Zac Matute 59 y o  male MRN: [id number]  Unit/Bed#: [facility] 427-01 Encounter: [id number]    Assessment:  Afshan Quintanilla is a 58 yo M w/PMHx PAD, and 1 PPD hx who presented to the ED for evaluation of his worsening orthopnea  On arrival, he had an elevated trop to   11 and pro BNP of 3544 with ST depressions and vascular congestion on CXR  He was given 20 mg IV lasix and admitted for additional ACS work-up                   Dispo:              PT/OT: pending              CM: LifeVest fitting today, Pre-auth for [employer]: cardiology                          Possible D/C today       Code: Level 1, Full     Dyspnea on Exertion              - Likely 2/2 CHF, NSTEMI - see below     NSTEMI              - ST depressions in V5-6 on ED EKG w/trop to 5 56               - Currently stable              - Continue ASA, Statin               - Cardiology onboard: see below plan      Acute Systolic CHF              - 2/24 Echo w/15% EF              - 2/26 Cardiac MRI w/several dilated LV, thin intramyocardial fibrosis @ mid IV septum, suggestive non-ischemic CM (viral?)                - 2/25 Cath: no obstructive disease              - Currently Euvolemic               - Cardiology onboard:                          - LifeVest fitted yesterday                           - Convert to Lasix 20 mg PO                          - Continue Toprol-XL 50 mg QD                           - Continue Entresto 24-26 mg BID (pre-auth being addressed)       - Repeat echo as OP  HTN              - Normotensive              - See above plan per cardiology       Hx Hep C              - Rx'd with Lorin Watkins in past      Elevated Random Blood Glucose              - AIC @ 6 4              - No diabetes diagnosis              - Continue to monitor    HLD              - Continue atorvastatin 40 mg QD      PVD              - OP w/Vasc Surgery - OP procedure planned for this week cancelled               - Continue ASA, statin      Tobacco Use               - Continue nicoderm CQ         Subjective/Objective     Subjective: Mr Sadi Lennon is feeling well today and looking forward to d/c     ROS:  Negative except NONE    Vitals: Blood pressure 102/70, pulse 85, temperature 97 5 °F (36 4 °C), temperature source Oral, resp  rate 18, height 5' 9" (1 753 m), weight 74 5 kg (164 lb 3 2 oz), SpO2 95 %  ,Body mass index is 24 25 kg/m²  Invasive Devices     Peripheral Intravenous Line            Peripheral IV 02/23/19 Right Antecubital 4 days    Peripheral IV 02/23/19 Right Forearm 4 days                Physical Exam: /70 (BP Location: Left arm)   Pulse 85   Temp 97 5 °F (36 4 °C) (Oral)   Resp 18   Ht 5' 9" (1 753 m)   Wt 74 5 kg (164 lb 3 2 oz)   SpO2 95%   BMI 24 25 kg/m²     General Appearance:    Alert, cooperative, no distress, appears stated age   Head:    Normocephalic, without obvious abnormality, atraumatic   Eyes:    PERRL, conjunctiva/corneas clear, EOM's intact, fundi     benign, both eyes        Ears:    Normal TM's and external ear canals, both ears   Nose:   Nares normal, septum midline, mucosa normal, no drainage    or sinus tenderness   Throat:   Lips, mucosa, and tongue normal; teeth and gums normal   Neck:   Supple, symmetrical, trachea midline, no adenopathy;        thyroid:  No enlargement/tenderness/nodules; no carotid    bruit or JVD   Back:     Symmetric, no curvature, ROM normal, no CVA tenderness   Lungs:     Clear to auscultation bilaterally, respirations unlabored   Chest wall:    No tenderness or deformity   Heart:    Regular rate and rhythm, S1 and S2 normal, no murmur, rub   or gallop  Heart sounds MUFFLED       Abdomen:     Soft, non-tender, bowel sounds active all four quadrants,     no masses, no organomegaly   Genitalia:    Normal male without lesion, discharge or tenderness   Rectal:    Normal tone, normal prostate, no masses or tenderness;    guaiac negative stool   Extremities: Extremities normal, atraumatic, no cyanosis or edema   Pulses:   2+ and symmetric all extremities   Skin:   Skin color, texture, turgor normal, no rashes or lesions   Lymph nodes:   Cervical, supraclavicular, and axillary nodes normal   Neurologic:   CNII-XII intact  Normal strength, sensation and reflexes       throughout       Lab, Imaging and other studies:   CBC:   Results from last 7 days   Lab Units 02/27/19  0527 02/25/19  0516 02/23/19  0354   WBC Thousand/uL 12 52* 14 19* 10 33*   RBC Million/uL 5 40 5 43 5 18   HEMOGLOBIN g/dL 16 3 16 3 15 4   HEMATOCRIT % 48 9 49 2 48 2   MCV fL 91 91 93   PLATELETS Thousands/uL 375 409* 430*   , BMP/CMP:   Results from last 7 days   Lab Units 02/27/19  0527 02/26/19  0503 02/25/19  0516  02/23/19  0355   SODIUM mmol/L 136 137 137   < > 139   POTASSIUM mmol/L 4 5 4 4 4 0   < > 4 4   CHLORIDE mmol/L 106 104 105   < > 105   CO2 mmol/L 25 26 24   < > 27   BUN mg/dL 24 24 21   < > 16   CREATININE mg/dL 1 12 1 10 1 08   < > 1 11   CALCIUM mg/dL 9 2 9 0 9 2   < > 8 9   AST U/L  --   --   --   --  11   ALT U/L  --   --   --   --  25   ALK PHOS U/L  --   --   --   --  85   EGFR ml/min/1 73sq m 71 73 74   < > 72    < > = values in this interval not displayed  VTE Prophylaxis: Enoxaparin (Lovenox)      Counseling / Coordination of Care  Total time spent today 30 minutes  Greater than 50% of total time was spent with the patient and / or family counseling and / or coordination of care   A description of the counseling / coordination of care: 45

## 2019-02-27 NOTE — PROGRESS NOTES
General Cardiology   Progress Note -  [employer]   Lidia Kincaid 59 y o  male MRN: [id number]    Unit/Bed#: [facility] 427-01 Encounter: [id number]    Assessment/ Plan:    Non-ischemic cardiomyopathy: MRI  showed severely dilated LV with EF 20%, thin strip of intramyocardial fibrosis suggestive of idiopathic, non-ischemic cardiomyopathy  Pt did have respiratory illness about 6 weeks ago; tx with abx  No significant alcohol or drug use  - tolerating metoprolol succinate 50gm daily and Entresto 24-26mg BID    - office is working on pre-authorization for [facility]; can follow up outpatient  LifeVest in room for discharge  Repeat echo as OP  Acute systolic congestive heart failure:   S/p IV diuresis; now on lasix 20mg daily and remains negative overall 2 6L  Weights stable 164lbs  Cr and K stable  Reinforced low Na diet and daily weights  Pt verbalizes understanding; he has     Type II MI: 2/2 acute CHF and new cardiomyopathy  Troponin peak 10 with characteristic rise and fall; cardiac cath did not reveal any obstructive disease  Remains chest pain free  Continue ASA, statin and BB  Discussed importance of smoking cessation; he is motivated       Hx of Hep C: completed treatment with Harvoni       Tobacco abuse: strongly advised cessation; nicotine patch  Pt is agreeable at this time, has some nicotine patches at home       PVD with claudication: f/b vascular surgery as OP; appears he was scheduled for procedure this week but will likely need to be canceled  F/u OP  On ASA/statin    Subjective: Pt seen for follow up, no events overnight  He is feeling well today, anxious to go home  No chest pain, SOB, LE edema or palpitations  He has been ambulating halls without symptoms  Review of Systems   Constitution: Negative for decreased appetite and fever  Cardiovascular: Negative for chest pain, dyspnea on exertion and leg swelling  Respiratory: Negative for cough and shortness of breath      Gastrointestinal: Negative for abdominal pain, nausea and vomiting  Genitourinary: Negative for dysuria  Neurological: Negative for dizziness and light-headedness  Psychiatric/Behavioral: Negative for altered mental status  Objective:   Vitals: Blood pressure 102/70, pulse 85, temperature 97 5 °F (36 4 °C), temperature source Oral, resp  rate 18, height 5' 9" (1 753 m), weight 74 5 kg (164 lb 3 2 oz), SpO2 95 %  ,     Body mass index is 24 25 kg/m²  ,     Systolic (59BOV), DZP:658 , Min:102 , BVE:501     Diastolic (74QNU), FUW:02, Min:64, Max:83      Intake/Output Summary (Last 24 hours) at 2/27/2019 0904  Last data filed at 2/27/2019 0700  Gross per 24 hour   Intake 1320 ml   Output 1200 ml   Net 120 ml     Weight (last 2 days)     Date/Time   Weight    02/27/19 0600   74 5 (164 2)    02/26/19 0600   74 9 (165 2)    02/25/19 0600   75 1 (165 5)            Telemetry Review:    Sinus rhythm, no significant events    Physical Exam   Constitutional: He is oriented to person, place, and time  No distress  Patient is sitting up in bed in NAD alert pleasant cooperative   HENT:   Head: Normocephalic and atraumatic  Neck: No JVD present  Cardiovascular: Normal rate, regular rhythm, S1 normal and S2 normal    No murmur heard  No lower extremity edema  Right radial cath site within normal limits no swelling or hematoma   Pulmonary/Chest: Effort normal and breath sounds normal    Musculoskeletal: He exhibits no edema  Neurological: He is alert and oriented to person, place, and time  Skin: Skin is dry  He is not diaphoretic  Psychiatric: He has a normal mood and affect  His behavior is normal    Nursing note and vitals reviewed      LABORATORY RESULTS  Results from last 7 days   Lab Units 02/24/19  0257 02/23/19  2331 02/23/19  2026   TROPONIN I ng/mL 8 39* 10 40* 10 20*     CBC with diff:   Results from last 7 days   Lab Units 02/27/19  0527 02/25/19  0516 02/23/19  0354   WBC Thousand/uL 12 52* 14 19* 10 33*   HEMOGLOBIN g/dL 16 3 16 3 15 4   HEMATOCRIT % 48 9 49 2 48 2   MCV fL 91 91 93   PLATELETS Thousands/uL 375 409* 430*   MCH pg 30 2 30 0 29 7   MCHC g/dL 33 3 33 1 32 0   RDW % 12 6 12 7 12 5   MPV fL 9 1 10 0 8 8*   NRBC AUTO /100 WBCs 0 0 0     CMP:  Results from last 7 days   Lab Units 02/27/19  0527 02/26/19  0503 02/25/19  0516 02/24/19  0257 02/23/19  0355   POTASSIUM mmol/L 4 5 4 4 4 0 4 1 4 4   CHLORIDE mmol/L 106 104 105 104 105   CO2 mmol/L 25 26 24 26 27   BUN mg/dL 24 24 21 20 16   CREATININE mg/dL 1 12 1 10 1 08 1 15 1 11   CALCIUM mg/dL 9 2 9 0 9 2 9 1 8 9   AST U/L  --   --   --   --  11   ALT U/L  --   --   --   --  25   ALK PHOS U/L  --   --   --   --  85   EGFR ml/min/1 73sq m 71 73 74 69 72     BMP:  Results from last 7 days   Lab Units 02/27/19  0527 02/26/19  0503 02/25/19  0516 02/24/19  0257 02/23/19  0355   POTASSIUM mmol/L 4 5 4 4 4 0 4 1 4 4   CHLORIDE mmol/L 106 104 105 104 105   CO2 mmol/L 25 26 24 26 27   BUN mg/dL 24 24 21 20 16   CREATININE mg/dL 1 12 1 10 1 08 1 15 1 11   CALCIUM mg/dL 9 2 9 0 9 2 9 1 8 9     Lab Results   Component Value Date    NTBNP 3,544 (H) 02/23/2019      Results from last 7 days   Lab Units 02/24/19  0257   MAGNESIUM mg/dL 2 1     Results from last 7 days   Lab Units 02/24/19  0257   HEMOGLOBIN A1C % 6 4*     Results from last 7 days   Lab Units 02/23/19  0741   INR  1 05     Lipid Profile:   No results found for: CHOL  Lab Results   Component Value Date    HDL 47 02/24/2019     Lab Results   Component Value Date    LDLCALC 68 02/24/2019     Lab Results   Component Value Date    TRIG 92 02/24/2019     Cardiac testing:   Results for orders placed during the hospital encounter of 02/23/19   Echo complete with contrast if indicated    Narrative [address]  [facility] [place] [address] [facility]  [phone]    Transthoracic Echocardiogram  2D, M-mode, Doppler, and Color Doppler    Study date:  24-Feb-2019    Patient: Vania Francis  MR number: [id number]  Account number: [de-identified]  : 1960  Age: 59 years  Gender: Male  Status: Inpatient  Location: Bedside  Height: 69 in  Weight: 163 7 lb  BP: 112/ 84 mmHg    Indications: NSTEMI    Diagnoses: I21 4 - Non-ST elevation (NSTEMI) myocardial infarction    Sonographer:  OMER Philip  Primary Physician:  Clark Henry DO  Referring Physician:  José Manuel Cobb MD  Group:  [facility]  Interpreting Physician:  Laxmi Lemus MD    SUMMARY    LEFT VENTRICLE:  The ventricle was mildly dilated  Systolic function was severely reduced  Ejection fraction was estimated to be 15 %  There were no regional wall motion abnormalities  There was severe diffuse hypokinesis  LEFT ATRIUM:  The atrium was mildly to moderately dilated  MITRAL VALVE:  There was mild annular calcification  There was mild regurgitation  TRICUSPID VALVE:  There was mild regurgitation  Pulmonary artery systolic pressure was within the normal range  HISTORY: PRIOR HISTORY: HLD; Smoker; PVD; NEWBERRY; HEP C; PAD    PROCEDURE: The procedure was performed at the bedside  This was a routine study  The transthoracic approach was used  The study included complete 2D imaging, M-mode, complete spectral Doppler, and color Doppler  Echocardiographic views  were limited due to decreased penetration and lung interference  This was a technically difficult study  LEFT VENTRICLE: The ventricle was mildly dilated  Systolic function was severely reduced  Ejection fraction was estimated to be 15 %  There were no regional wall motion abnormalities  There was severe diffuse hypokinesis  Wall thickness  was normal  No evidence of apical thrombus  DOPPLER: Left ventricular diastolic function parameters were normal     RIGHT VENTRICLE: The size was normal  Systolic function was normal  Wall thickness was normal     LEFT ATRIUM: The atrium was mildly to moderately dilated      RIGHT ATRIUM: Size was normal     MITRAL VALVE: There was mild annular calcification  Valve structure was normal  There was mild thickening  There was normal leaflet separation  DOPPLER: The transmitral velocity was within the normal range  There was no evidence for  stenosis  There was mild regurgitation  AORTIC VALVE: The valve was trileaflet  Leaflets exhibited normal thickness and normal cuspal separation  DOPPLER: Transaortic velocity was within the normal range  There was no evidence for stenosis  There was no significant  regurgitation  TRICUSPID VALVE: The valve structure was normal  There was normal leaflet separation  DOPPLER: The transtricuspid velocity was within the normal range  There was no evidence for stenosis  There was mild regurgitation  Pulmonary artery  systolic pressure was within the normal range  PULMONIC VALVE: Leaflets exhibited normal thickness, no calcification, and normal cuspal separation  DOPPLER: The transpulmonic velocity was within the normal range  There was no significant regurgitation  PERICARDIUM: There was no pericardial effusion  The pericardium was normal in appearance  AORTA: The root exhibited normal size  SYSTEMIC VEINS: IVC: The inferior vena cava was normal in size      SYSTEM MEASUREMENT TABLES    2D  %FS: 9 68 %  Ao Diam: 3 07 cm  EDV(Teich): 140 29 ml  EF Biplane: 29 5 %  EF(Teich): 21 02 %  ESV(Teich): 110 8 ml  IVSd: 1 01 cm  LA Area: 27 57 cm2  LA Diam: 4 01 cm  LVEDV MOD A2C: 232 99 ml  LVEDV MOD A4C: 327 88 ml  LVEDV MOD BP: 293 43 ml  LVEF MOD A2C: 23 75 %  LVEF MOD A4C: 32 84 %  LVESV MOD A2C: 177 66 ml  LVESV MOD A4C: 220 22 ml  LVESV MOD BP: 206 88 ml  LVIDd: 5 38 cm  LVIDs: 4 86 cm  LVLd A2C: 9 4 cm  LVLd A4C: 10 61 cm  LVLs A2C: 8 39 cm  LVLs A4C: 9 2 cm  LVPWd: 1 06 cm  RA Area: 8 04 cm2  RVIDd: 2 42 cm  SV MOD A2C: 55 33 ml  SV MOD A4C: 107 66 ml  SV(Teich): 29 49 ml    CW  RAP: 10 mmHg  TR Vmax: 2 58 m/s  TR maxP 59 mmHg    MM  TAPSE: 1 2 cm    PW  E': 0 04 m/s  E/E': 20 95  MV A Rito: 0 5 m/s  MV Dec Edwards: 5 94 m/s2  MV DecT: 153 2 ms  MV E Rito: 0 91 m/s  MV E/A Ratio: 1 82  MV PHT: 44 43 ms  MVA By PHT: 4 95 cm2  RVSP: 36 59 mmHg    [facility] Accredited Echocardiography Laboratory    Prepared and electronically signed by    Brent Rhoades MD  Signed [id number] 15:25:39       Meds/Allergies   current meds:   Current Facility-Administered Medications   Medication Dose Route Frequency    acetaminophen (TYLENOL) tablet 650 mg  650 mg Oral Q6H PRN    aspirin (ECOTRIN LOW STRENGTH) EC tablet 81 mg  81 mg Oral Daily    atorvastatin (LIPITOR) tablet 40 mg  40 mg Oral After Dinner    cyclobenzaprine (FLEXERIL) tablet 10 mg  10 mg Oral BID PRN    diazepam (VALIUM) tablet 2 mg  2 mg Oral Q8H PRN    docusate sodium (COLACE) capsule 100 mg  100 mg Oral BID    enoxaparin (LOVENOX) subcutaneous injection 40 mg  40 mg Subcutaneous Q24H [facility]    furosemide (LASIX) tablet 20 mg  20 mg Oral Daily    meloxicam (MOBIC) tablet 7 5 mg  7 5 mg Oral Daily    metoprolol succinate (TOPROL-XL) 24 hr tablet 50 mg  50 mg Oral Daily    nicotine (NICODERM CQ) 21 mg/24 hr TD 24 hr patch 1 patch  1 patch Transdermal Daily    ondansetron (ZOFRAN) injection 4 mg  4 mg Intravenous Q8H PRN    oxyCODONE-acetaminophen (PERCOCET) 5-325 mg per tablet 1 tablet  1 tablet Oral Q8H PRN    polyethylene glycol (MIRALAX) packet 17 g  17 g Oral Daily PRN    sacubitril-valsartan (ENTRESTO) 24-26 MG per tablet 1 tablet  1 tablet Oral BID    senna (SENOKOT) tablet 8 6 mg  1 tablet Oral Daily     Medications Prior to Admission   Medication    aspirin (ECOTRIN LOW STRENGTH) 81 mg EC tablet    atorvastatin (LIPITOR) 20 mg tablet    cyclobenzaprine (FLEXERIL) 10 mg tablet    diazepam (VALIUM) 5 mg tablet    meloxicam (MOBIC) 7 5 mg tablet    oxyCODONE-acetaminophen (PERCOCET) 5-325 mg per tablet    sildenafil (REVATIO) 20 mg tablet    naproxen (NAPROSYN) 500 mg tablet     Assessment:  Principal Problem:    Non-ischemic cardiomyopathy (HCC)  Active Problems:    Acute systolic (congestive) heart failure (HCC)    Dyslipidemia    Tobacco abuse disorder    Type 2 myocardial infarction [facility])    Peripheral vascular disease (HCC)    Elevated random blood glucose level    Opioid dependence ([facility] )    Counseling / Coordination of Care  Total floor / unit time spent today 20 minutes  Greater than 50% of total time was spent with the patient and / or family counseling and / or coordination of care  ** Please Note: Dragon 360 Dictation voice to text software may have been used in the creation of this document   **

## 2019-02-28 ENCOUNTER — TELEPHONE (OUTPATIENT)
Dept: CARDIOLOGY CLINIC | Facility: CLINIC | Age: 59
End: 2019-02-28

## 2019-02-28 ENCOUNTER — EPISODE CHANGES (OUTPATIENT)
Dept: CASE MANAGEMENT | Facility: HOSPITAL | Age: 59
End: 2019-02-28

## 2019-02-28 ENCOUNTER — ANESTHESIA (OUTPATIENT)
Dept: PERIOP | Facility: HOSPITAL | Age: 59
End: 2019-02-28

## 2019-03-02 NOTE — DISCHARGE SUMMARY
Discharge Summary - Ivan Rey 59 y o  male MRN: [id number]    Unit/Bed#: [facility] 427-01 Encounter: [id number]    Admission Date:   Admission Orders (From admission, onward)    Ordered        02/23/19 0607  Inpatient Admission (expected length of stay for this patient Order details is greater than two midnights)  Once     Order ID Start Status   [id number] 02/23/19 0608 Completed                Admitting Diagnosis: Shortness of breath [R06 02]  Acute respiratory distress [R06 03]  CHF (congestive heart failure) ([facility] [address] ) [I50 9]  EKG abnormalities [R94 31]  Elevated troponin [R74 8]    HPI:  Ivan Rey is a 59 y o  male with history of PVD, smoker 1 PPD who presented from home for evaluation of progressively getting worse shortness of breath with activities, orthopnea over the past 4 days  Patient denies getting worse leg edema fever chills productive cough  He follows with vascular surgeon for his PVD and  "blockage in the  right leg"  Workup in the ER reveal elevated troponin of 0 11 ,proBNP 3544  EKG showed ST depression in V5 through 6, chest x-ray remarkable for significant vascular congestion( official report pending)  Patient was given 20 mg of IV Lasix in the ER  He admitted to the hospitalist service on an  inpatient basis fall ACS and  HF workup        Procedures Performed:   Orders Placed This Encounter   Procedures    Cardiac catheterization    ED ECG Documentation Only       Summary of Hospital Course:  Mr Gabby davidson was admitted for worsening of orthopnea  She was now he was noted to have BNP of 3544 and troponin of   11  EKG showed ST depressions in V5 through V6  He initially received 20 mg IV Lasix and admitted to rule ACS  He has NSTEMI with ST depressions in V5 V6 on EKG troponins were raised up to 5 56  He has acute systolic CHF foot echo showed ejection fraction of 15%    MRI of the chest shows dilated left ventricle, thin myocardial fibrosis this probably related to an ischemic cardiomyopathy of probably a viral origin  Catheterization done on 02/25/2019 shows no obstructive disease  He was euvolemic at the time of discharge cardiology was following his life for fast was fitted  He was on IV Lasix which was converted to p o  Lasix 20 mg daily and Toprol-XL 50 mg daily he would also need to start and Entresto 024-26 mg b i d , however he requires preauthorization and Cardiology is working to set that up  He would require an outpatient echo  His blood pressure is normotensive did not require any treatment, will continue with heart failure medications at home  He has hep C and was treated with Havorni  He has elevated blood glucose hemoglobin A1c of 6 4  No diabetes is diagnosed  He has hyperlipidemia which was treated with atorvastatin 40 mg daily he needs to follow up with vascular surgery outpatient  And he needs to continue with his aspirin and statin  Educated to quit smoking he was offered Nicoderm patch  He was stable to be discharged with followups with Cardiology  Significant Findings, Care, Treatment and Services Provided: as above    Complications: none    Discharge Diagnosis: Acute CHF    Resolved Problems  Date Reviewed: 2/26/2019          Resolved    Dyspnea on exertion 2/26/2019     Resolved by  Shama Griffith MD          Condition at Discharge: stable         Discharge instructions/Information to patient and family:   See after visit summary for information provided to patient and family  Provisions for Follow-Up Care:  See after visit summary for information related to follow-up care and any pertinent home health orders  PCP: Delicia Collazo DO    Disposition: Home    Planned Readmission: No    Discharge Statement   I spent 35 minutes discharging the patient  This time was spent on the day of discharge  I had direct contact with the patient on the day of discharge  Additional documentation is required if more than 30 minutes were spent on discharge  Discharge Medications:  See after visit summary for reconciled discharge medications provided to patient and family

## 2019-03-06 ENCOUNTER — PATIENT OUTREACH (OUTPATIENT)
Dept: CASE MANAGEMENT | Facility: OTHER | Age: 59
End: 2019-03-06

## 2019-03-07 ENCOUNTER — OFFICE VISIT (OUTPATIENT)
Dept: CARDIOLOGY CLINIC | Facility: CLINIC | Age: 59
End: 2019-03-07
Payer: MEDICARE

## 2019-03-07 VITALS
WEIGHT: 172.1 LBS | OXYGEN SATURATION: 98 % | HEIGHT: 69 IN | HEART RATE: 82 BPM | SYSTOLIC BLOOD PRESSURE: 96 MMHG | BODY MASS INDEX: 25.49 KG/M2 | DIASTOLIC BLOOD PRESSURE: 60 MMHG

## 2019-03-07 DIAGNOSIS — I50.22 CHRONIC SYSTOLIC HEART FAILURE (HCC): Primary | ICD-10-CM

## 2019-03-07 DIAGNOSIS — Z72.0 TOBACCO ABUSE DISORDER: Chronic | ICD-10-CM

## 2019-03-07 DIAGNOSIS — I25.10 CORONARY ARTERY DISEASE INVOLVING NATIVE CORONARY ARTERY OF NATIVE HEART WITHOUT ANGINA PECTORIS: ICD-10-CM

## 2019-03-07 DIAGNOSIS — I42.0 DILATED CARDIOMYOPATHY (HCC): ICD-10-CM

## 2019-03-07 DIAGNOSIS — I73.9 PERIPHERAL VASCULAR DISEASE (HCC): Chronic | ICD-10-CM

## 2019-03-07 DIAGNOSIS — B18.2 CHRONIC HEPATITIS C WITHOUT HEPATIC COMA (HCC): ICD-10-CM

## 2019-03-07 PROCEDURE — 99215 OFFICE O/P EST HI 40 MIN: CPT | Performed by: NURSE PRACTITIONER

## 2019-03-07 NOTE — PATIENT INSTRUCTIONS
Maintain a 2 gram daily sodium diet and 1500 ml daily fluid restriction  Check daily weights  If you gained 3 pounds in one day, 5 pounds in one week, or experience worsening shortness of breath or increasing lower leg swelling  Please call the heart failure office at [phone]    Please bring a  list of your current medications and daily weights to the office visit    Lab studies

## 2019-03-07 NOTE — PROGRESS NOTES
CM called and spoke with patient, explained outpt CM role with introduction  Patient stated he is still SOB   Patient agreeable to follow up  CM reviewed discharge instructions and medication list  Patient agreeable, appears overwhelmed and a little reluctant to comply to instructions  Patient has Cardiology appt on 3/7, needs to make PCP appt, and set up Cardiac Rehab  CM inquired of Life Vest, patient stated he has been wearing it, until today, whenever he tucked in his shirt, the alarms went off  Patient left house for quick errand, and returned home and put Life Vest on  CM inquired if he had any contact information for [employer], for support with life vest issues, patient replied yes  CM inquired of daily weights, he is weighing himself, but not everyday  CM reviewed the rational behind daily weights, as it can be a definite measurement and observation of weight gain, can be proactive in watching for s/s heart failure  Patient reported no LE edema, again appeared overwhelmed with all the information, and stated it is hard to tell of increased swelling  Patient mentioned that there was a lot going on in his life, but did not disclose anything further  Patient stated his daughter assists with managing medications, knew the medications when CM reviewed them with him  Patient reporting he seldom takes Sildenafil, takes Flexeril approx once a day  CM offered information on low sodium diet, patient would like the information  CM made patient aware CM will follow up in 2-3 weeks, gave him CM's contact information, encouraged him to call with any questions or concerns  CM mailed business card, bundle paperwork, and low sodium diet paperwork

## 2019-03-07 NOTE — PROGRESS NOTES
Heart Failure Office Visit   Talisha Arauz 59 y o  male MRN: [id number]    hospitals  Mr Talisha Arauz is a 59year old male with a known past medical history of  PVD  Tobacco abuse  Hepatitis C completed treatment with [facility]  Patient Active Problem List   Diagnosis    Cervical disc disease    Cervical radiculopathy    Chronic hepatitis C without hepatic coma [facility]    Diverticulosis of large intestine without hemorrhage    Dyslipidemia    Erectile dysfunction    Left wrist pain    Lumbar disc disease with radiculopathy    Recurrent right inguinal hernia    Tobacco abuse disorder    Umbilical hernia without obstruction and without gangrene    Superficial thrombophlebitis    Atheroscler native arteries the extremities w/intermit claudication ([facility]    Atherosclerosis of native artery of right lower extremity with intermittent claudication (HCC)    Type 2 myocardial infarction ([facility]    Peripheral vascular disease ([facility]    Elevated random blood glucose level    Non-ischemic cardiomyopathy ([facility]    Acute systolic (congestive) heart failure ([facility]    Opioid dependence [facility])       Mr Talisha Arauz was admitted to [facility] on 2/23 - 2/27/19 nonischemic cardiomyopathy  He presented with a four-day history of progressive shortness of breath, orthopnea lower extremity edema fevers and chills  Emergency room NT proBNP 3 544  EKG showed ST depressions in V5 through V6  Chest x-ray showed a significant vascular congestion  He was treated with IV Lasix 20 mg  Troponin increased to 5 56 TTE showed left ventricle mildly dilated, systolic function severely reduced LVEF 15% LVIDd 5 38cm,  there was severe diffuse hypokinesis  Left atrium mildly to moderately dilated  Mild mitral valve regurgitation  Right ventricular size systolic function normal   Mild MR, mild TR  Cardiac catheterization mid LAD 30% mid ramus intermediate 40% proximal RCA 50%     Cardiac MRI severely dilated left ventricle severely reduced LV function no significant valvular abnormalities normal right ventricular size systolic function  Thin strip of intra myocardial fibrosis of the basal to mid interventricular septum suggestive idiopathic nonischemic viral cardiomyopathy  Lipid panel Hemoglobin A1c 6 4  Lipid panel cholesterol 133, Trig 92, HDL 47, LDL 68  He was discharged home  with a life vest       Today Mr Lidia Kincaid presents to our office today for a recent hospitalization follow up visit  He admits to dyspnea with minimal exertion  He denies in the orthopnea PND chest pain palpitations lightheadedness or dizziness  His weight at home is 165 6 lb  Weight in the office 172 lb  He has abiding by 2 g sodium diet  Kevansergio Rucker is wearing his Life Vest no therapies delivered    Review of Systems   Constitution: Positive for malaise/fatigue  Respiratory: Positive for shortness of breath  Psychiatric/Behavioral: Positive for memory loss  Objective:   Vitals: RUE sitting 90/60   Vitals:    03/07/19 0938   BP: 96/60   BP Location: Right arm   Patient Position: Sitting   Cuff Size: Standard   Pulse: 82   SpO2: 98%   Weight: 78 1 kg (172 lb 1 6 oz)   Height: 5' 9" (1 753 m)     Body mass index is 25 41 kg/m²  Wt Readings from Last 3 Encounters:   02/27/19 74 5 kg (164 lb 3 2 oz)   02/25/19 75 1 kg (165 lb 9 1 oz)   02/11/19 78 5 kg (173 lb)         Physical Exam:  GEN: Lidia Kincaid appears well, alert and oriented x 3, pleasant and cooperative   HEENT: pupils equal, round, and reactive to light; extraocular muscles intact  NECK: supple, no carotid bruits   HEART: regular rhythm, normal S1 and S2, no murmurs, clicks, gallops or rubs, JVP is flat   LUNGS: clear to auscultation bilaterally; no wheezes, rales, or rhonchi   ABDOMEN: normal bowel sounds, soft, no tenderness, no distention  EXTREMITIES: peripheral pulses normal; no clubbing, cyanosis, or edema, slightly cool hands and ankles      NEURO: no focal findings SKIN: normal without suspicious lesions on exposed skin      Current Outpatient Medications:     aspirin (ECOTRIN LOW STRENGTH) 81 mg EC tablet, Take 1 tablet (81 mg total) by mouth daily, Disp: , Rfl:     atorvastatin (LIPITOR) 40 mg tablet, Take 1 tablet (40 mg total) by mouth daily after dinner, Disp: 30 tablet, Rfl: 0    cyclobenzaprine (FLEXERIL) 10 mg tablet, TAKE 1 TABLET (10 MG TOTAL) BY MOUTH 2 (TWO) TIMES A DAY AS NEEDED FOR MUSCLE SPASMS , Disp: , Rfl:     diazepam (VALIUM) 2 mg tablet, Take 1 tablet (2 mg total) by mouth every 8 (eight) hours as needed for muscle spasms for up to 10 days, Disp: 30 tablet, Rfl: 0    furosemide (LASIX) 20 mg tablet, Take 1 tablet (20 mg total) by mouth daily, Disp: 30 tablet, Rfl: 0    meloxicam (MOBIC) 7 5 mg tablet, Take 1 tablet (7 5 mg total) by mouth daily, Disp: 30 tablet, Rfl: 0    metoprolol succinate (TOPROL-XL) 50 mg 24 hr tablet, Take 1 tablet (50 mg total) by mouth daily, Disp: 30 tablet, Rfl: 0    naproxen (NAPROSYN) 500 mg tablet, Take 500 mg by mouth daily , Disp: , Rfl:     oxyCODONE-acetaminophen (PERCOCET) 5-325 mg per tablet, Take 1 tablet by mouth every 8 (eight) hours as needed for moderate pain or severe pain for up to 10 daysMax Daily Amount: 3 tablets, Disp: 30 tablet, Rfl: 0    polyethylene glycol (MIRALAX) 17 g packet, Take 17 g by mouth daily, Disp: 14 each, Rfl: 0    sacubitril-valsartan (ENTRESTO) 24-26 MG TABS, Take 1 tablet by mouth 2 (two) times a day, Disp: 28 tablet, Rfl: 0    senna (SENOKOT) 8 6 mg, Take 1 tablet (8 6 mg total) by mouth daily, Disp: 120 each, Rfl: 0    sildenafil (REVATIO) 20 mg tablet, 3 TO 5 PILLS ORALLY X SINGLE DOSE DAILY PRN, Disp: , Rfl:         Assessment/Plan:   1   Chronic Systolic heart failure NYHA class 3 stage C- on PE eu volemic and compensated, HR and BP controlled,   Weight at home 165 6 pounds  Continue on metoprolol succinate 50 mg daily, Entresto 24-26 mg b i d unable to up titrate Entresto at this time due to blood pressure  Continue on  Lasix 20 mg daily,  Lab studies to be done BMP, magnesium, ferritin, SPEP, HIV, TSH  Maintain a 2 gram daily sodium diet and 1500 ml daily fluid restriction  Check daily weights  If you gain 3 pounds in one day, 5 pounds in one week, or experience worsening shortness of breath or increasing lower leg swelling  Please call the heart failure office at [phone]  Please bring a  list of your current medications and daily weights to the office visit  Follow up with Dr Calixto Ferris or Dr Trish Littlejohn in 4 weeks and Dr Mikhail Campos there after   2  DCM LVEF 15% LIVDd 5 38cm- QRS 130ms ,  Wearing a life vest, follow up TTE in 3 months  3  Non obstructive CAD- cardiac catheterization on 2/25/19 mid LAD diffuse 30% stenosis, mid ramus intermediate 40% stenosis, proximal RCA 50% stenosis, continue on aspirin 81 mg daily, metoprolol succinate 50 mg daily, Lipitor 40 mg daily  4  Tobacco abuse- continues with smoking cessation  5  PVD- followed by vascular  6    Hepatitis C completed treatment with [facility]  3/7/2019  9:37 AM

## 2019-03-08 ENCOUNTER — TELEPHONE (OUTPATIENT)
Dept: VASCULAR SURGERY | Facility: CLINIC | Age: 59
End: 2019-03-08

## 2019-03-08 NOTE — TELEPHONE ENCOUNTER
Called [facility] and spoke to chantal  to inform them we are requesting CC prior to rescheduling patient's AGRAM with Dr Ayad Pringle saw [address] yesterday 3-7-19   CC form faxed to [phone]  Will forward this message to our nursing dept to f/u with CC   SJ

## 2019-04-05 ENCOUNTER — OFFICE VISIT (OUTPATIENT)
Dept: CARDIOLOGY CLINIC | Facility: CLINIC | Age: 59
End: 2019-04-05
Payer: MEDICARE

## 2019-04-05 VITALS
HEART RATE: 81 BPM | WEIGHT: 177 LBS | BODY MASS INDEX: 26.22 KG/M2 | OXYGEN SATURATION: 95 % | SYSTOLIC BLOOD PRESSURE: 94 MMHG | HEIGHT: 69 IN | DIASTOLIC BLOOD PRESSURE: 60 MMHG

## 2019-04-05 DIAGNOSIS — I50.21 ACUTE SYSTOLIC (CONGESTIVE) HEART FAILURE (HCC): ICD-10-CM

## 2019-04-05 DIAGNOSIS — I50.9 CHF (CONGESTIVE HEART FAILURE) (HCC): ICD-10-CM

## 2019-04-05 DIAGNOSIS — Z72.0 TOBACCO USE: ICD-10-CM

## 2019-04-05 DIAGNOSIS — I50.22 CHRONIC SYSTOLIC CHF (CONGESTIVE HEART FAILURE), NYHA CLASS 3 (HCC): ICD-10-CM

## 2019-04-05 DIAGNOSIS — I42.8 NON-ISCHEMIC CARDIOMYOPATHY (HCC): ICD-10-CM

## 2019-04-05 DIAGNOSIS — I42.8 NICM (NONISCHEMIC CARDIOMYOPATHY) (HCC): Primary | ICD-10-CM

## 2019-04-05 PROCEDURE — 99214 OFFICE O/P EST MOD 30 MIN: CPT | Performed by: INTERNAL MEDICINE

## 2019-04-05 PROCEDURE — 93000 ELECTROCARDIOGRAM COMPLETE: CPT | Performed by: INTERNAL MEDICINE

## 2019-04-05 RX ORDER — FOLIC ACID 0.8 MG
TABLET ORAL
COMMUNITY

## 2019-04-05 RX ORDER — METOPROLOL SUCCINATE 50 MG/1
50 TABLET, EXTENDED RELEASE ORAL DAILY
Qty: 30 TABLET | Refills: 5 | Status: SHIPPED | OUTPATIENT
Start: 2019-04-05 | End: 2019-10-04 | Stop reason: SDUPTHER

## 2019-04-05 RX ORDER — FUROSEMIDE 20 MG/1
20 TABLET ORAL DAILY
Qty: 30 TABLET | Refills: 4 | Status: SHIPPED | OUTPATIENT
Start: 2019-04-05 | End: 2019-07-02 | Stop reason: SDUPTHER

## 2019-04-08 DIAGNOSIS — I70.211 ATHEROSCLEROSIS OF NATIVE ARTERY OF RIGHT LOWER EXTREMITY WITH INTERMITTENT CLAUDICATION (HCC): Primary | ICD-10-CM

## 2019-04-09 ENCOUNTER — PATIENT OUTREACH (OUTPATIENT)
Dept: CASE MANAGEMENT | Facility: OTHER | Age: 59
End: 2019-04-09

## 2019-04-09 ENCOUNTER — TELEPHONE (OUTPATIENT)
Dept: VASCULAR SURGERY | Facility: CLINIC | Age: 59
End: 2019-04-09

## 2019-04-09 DIAGNOSIS — I70.211 ATHEROSCLEROSIS OF NATIVE ARTERY OF RIGHT LOWER EXTREMITY WITH INTERMITTENT CLAUDICATION (HCC): Primary | ICD-10-CM

## 2019-04-11 ENCOUNTER — PATIENT OUTREACH (OUTPATIENT)
Dept: CASE MANAGEMENT | Facility: OTHER | Age: 59
End: 2019-04-11

## 2019-04-16 ENCOUNTER — TELEPHONE (OUTPATIENT)
Dept: CARDIOLOGY CLINIC | Facility: CLINIC | Age: 59
End: 2019-04-16

## 2019-04-16 ENCOUNTER — PATIENT OUTREACH (OUTPATIENT)
Dept: CASE MANAGEMENT | Facility: OTHER | Age: 59
End: 2019-04-16

## 2019-04-18 ENCOUNTER — APPOINTMENT (OUTPATIENT)
Dept: LAB | Facility: CLINIC | Age: 59
End: 2019-04-18
Payer: MEDICARE

## 2019-04-18 DIAGNOSIS — I70.211 ATHEROSCLEROSIS OF NATIVE ARTERY OF RIGHT LOWER EXTREMITY WITH INTERMITTENT CLAUDICATION (HCC): ICD-10-CM

## 2019-04-18 LAB
ANION GAP SERPL CALCULATED.3IONS-SCNC: 9 MMOL/L (ref 4–13)
BUN SERPL-MCNC: 26 MG/DL (ref 5–25)
CALCIUM SERPL-MCNC: 8.7 MG/DL (ref 8.3–10.1)
CHLORIDE SERPL-SCNC: 110 MMOL/L (ref 100–108)
CO2 SERPL-SCNC: 24 MMOL/L (ref 21–32)
CREAT SERPL-MCNC: 1.3 MG/DL (ref 0.6–1.3)
ERYTHROCYTE [DISTWIDTH] IN BLOOD BY AUTOMATED COUNT: 13 % (ref 11.6–15.1)
GFR SERPL CREATININE-BSD FRML MDRD: 59 ML/MIN/1.73SQ M
GLUCOSE SERPL-MCNC: 166 MG/DL (ref 65–140)
HCT VFR BLD AUTO: 43.9 % (ref 36.5–49.3)
HGB BLD-MCNC: 14.4 G/DL (ref 12–17)
INR PPP: 1.09 (ref 0.86–1.17)
MCH RBC QN AUTO: 29.8 PG (ref 26.8–34.3)
MCHC RBC AUTO-ENTMCNC: 32.8 G/DL (ref 31.4–37.4)
MCV RBC AUTO: 91 FL (ref 82–98)
PLATELET # BLD AUTO: 340 THOUSANDS/UL (ref 149–390)
PMV BLD AUTO: 9.9 FL (ref 8.9–12.7)
POTASSIUM SERPL-SCNC: 3.8 MMOL/L (ref 3.5–5.3)
PROTHROMBIN TIME: 14.2 SECONDS (ref 11.8–14.2)
RBC # BLD AUTO: 4.84 MILLION/UL (ref 3.88–5.62)
SODIUM SERPL-SCNC: 143 MMOL/L (ref 136–145)
WBC # BLD AUTO: 10.44 THOUSAND/UL (ref 4.31–10.16)

## 2019-04-18 PROCEDURE — 85610 PROTHROMBIN TIME: CPT

## 2019-04-18 PROCEDURE — 80048 BASIC METABOLIC PNL TOTAL CA: CPT

## 2019-04-18 PROCEDURE — 85027 COMPLETE CBC AUTOMATED: CPT

## 2019-04-18 PROCEDURE — 36415 COLL VENOUS BLD VENIPUNCTURE: CPT

## 2019-04-19 ENCOUNTER — TELEPHONE (OUTPATIENT)
Dept: RADIOLOGY | Facility: HOSPITAL | Age: 59
End: 2019-04-19

## 2019-04-25 ENCOUNTER — HOSPITAL ENCOUNTER (OUTPATIENT)
Dept: RADIOLOGY | Facility: HOSPITAL | Age: 59
Discharge: HOME | End: 2019-04-25
Attending: SURGERY | Admitting: SURGERY
Payer: MEDICARE

## 2019-04-25 VITALS
RESPIRATION RATE: 17 BRPM | TEMPERATURE: 97.7 F | HEART RATE: 76 BPM | SYSTOLIC BLOOD PRESSURE: 106 MMHG | OXYGEN SATURATION: 97 % | DIASTOLIC BLOOD PRESSURE: 69 MMHG

## 2019-04-25 DIAGNOSIS — I73.9 PERIPHERAL VASCULAR DISEASE (HCC): Chronic | ICD-10-CM

## 2019-04-25 DIAGNOSIS — I70.211 ATHEROSCLEROSIS OF NATIVE ARTERY OF RIGHT LOWER EXTREMITY WITH INTERMITTENT CLAUDICATION (HCC): Primary | ICD-10-CM

## 2019-04-25 PROCEDURE — 99152 MOD SED SAME PHYS/QHP 5/>YRS: CPT

## 2019-04-25 PROCEDURE — 75710 ARTERY X-RAYS ARM/LEG: CPT | Performed by: SURGERY

## 2019-04-25 PROCEDURE — C1769 GUIDE WIRE: HCPCS

## 2019-04-25 PROCEDURE — C1887 CATHETER, GUIDING: HCPCS

## 2019-04-25 PROCEDURE — C1876 STENT, NON-COA/NON-COV W/DEL: HCPCS

## 2019-04-25 PROCEDURE — 99152 MOD SED SAME PHYS/QHP 5/>YRS: CPT | Performed by: SURGERY

## 2019-04-25 PROCEDURE — C1725 CATH, TRANSLUMIN NON-LASER: HCPCS

## 2019-04-25 PROCEDURE — C1894 INTRO/SHEATH, NON-LASER: HCPCS

## 2019-04-25 PROCEDURE — 99153 MOD SED SAME PHYS/QHP EA: CPT

## 2019-04-25 PROCEDURE — 37226 HB FEM/POPL REVASC W/STENT: CPT

## 2019-04-25 PROCEDURE — C1760 CLOSURE DEV, VASC: HCPCS

## 2019-04-25 PROCEDURE — 75710 ARTERY X-RAYS ARM/LEG: CPT

## 2019-04-25 PROCEDURE — 37224 PR REVSC OPN/PRG FEM/POP W/ANGIOPLASTY UNI: CPT | Performed by: SURGERY

## 2019-04-25 RX ORDER — SODIUM CHLORIDE 9 MG/ML
100 INJECTION, SOLUTION INTRAVENOUS CONTINUOUS
Status: DISCONTINUED | OUTPATIENT
Start: 2019-04-25 | End: 2019-04-25 | Stop reason: HOSPADM

## 2019-04-25 RX ORDER — FENTANYL CITRATE 50 UG/ML
INJECTION, SOLUTION INTRAMUSCULAR; INTRAVENOUS CODE/TRAUMA/SEDATION MEDICATION
Status: COMPLETED | OUTPATIENT
Start: 2019-04-25 | End: 2019-04-25

## 2019-04-25 RX ORDER — MIDAZOLAM HYDROCHLORIDE 1 MG/ML
INJECTION INTRAMUSCULAR; INTRAVENOUS CODE/TRAUMA/SEDATION MEDICATION
Status: COMPLETED | OUTPATIENT
Start: 2019-04-25 | End: 2019-04-25

## 2019-04-25 RX ORDER — OXYCODONE HYDROCHLORIDE AND ACETAMINOPHEN 5; 325 MG/1; MG/1
1 TABLET ORAL EVERY 4 HOURS PRN
Status: DISCONTINUED | OUTPATIENT
Start: 2019-04-25 | End: 2019-04-25 | Stop reason: HOSPADM

## 2019-04-25 RX ORDER — ACETAMINOPHEN 325 MG/1
650 TABLET ORAL EVERY 4 HOURS PRN
Status: DISCONTINUED | OUTPATIENT
Start: 2019-04-25 | End: 2019-04-25 | Stop reason: HOSPADM

## 2019-04-25 RX ORDER — SODIUM CHLORIDE 9 MG/ML
125 INJECTION, SOLUTION INTRAVENOUS CONTINUOUS
Status: DISCONTINUED | OUTPATIENT
Start: 2019-04-25 | End: 2019-04-25 | Stop reason: HOSPADM

## 2019-04-25 RX ORDER — CLOPIDOGREL BISULFATE 75 MG/1
150 TABLET ORAL ONCE
Status: COMPLETED | OUTPATIENT
Start: 2019-04-25 | End: 2019-04-25

## 2019-04-25 RX ORDER — HEPARIN SODIUM 1000 [USP'U]/ML
INJECTION, SOLUTION INTRAVENOUS; SUBCUTANEOUS CODE/TRAUMA/SEDATION MEDICATION
Status: COMPLETED | OUTPATIENT
Start: 2019-04-25 | End: 2019-04-25

## 2019-04-25 RX ORDER — OXYCODONE HYDROCHLORIDE 5 MG/1
10 TABLET ORAL EVERY 4 HOURS PRN
Status: DISCONTINUED | OUTPATIENT
Start: 2019-04-25 | End: 2019-04-25 | Stop reason: HOSPADM

## 2019-04-25 RX ORDER — CLOPIDOGREL BISULFATE 75 MG/1
75 TABLET ORAL DAILY
Qty: 30 TABLET | Refills: 2 | Status: SHIPPED | OUTPATIENT
Start: 2019-04-25

## 2019-04-25 RX ADMIN — FENTANYL CITRATE 25 MCG: 50 INJECTION, SOLUTION INTRAMUSCULAR; INTRAVENOUS at 12:49

## 2019-04-25 RX ADMIN — MIDAZOLAM 0.5 MG: 1 INJECTION INTRAMUSCULAR; INTRAVENOUS at 11:08

## 2019-04-25 RX ADMIN — HEPARIN SODIUM 1000 UNITS: 1000 INJECTION INTRAVENOUS; SUBCUTANEOUS at 12:12

## 2019-04-25 RX ADMIN — MIDAZOLAM 0.5 MG: 1 INJECTION INTRAMUSCULAR; INTRAVENOUS at 12:32

## 2019-04-25 RX ADMIN — CLOPIDOGREL BISULFATE 150 MG: 75 TABLET ORAL at 15:12

## 2019-04-25 RX ADMIN — FENTANYL CITRATE 25 MCG: 50 INJECTION, SOLUTION INTRAMUSCULAR; INTRAVENOUS at 12:32

## 2019-04-25 RX ADMIN — IODIXANOL 70 ML: 320 INJECTION, SOLUTION INTRAVASCULAR at 13:33

## 2019-04-25 RX ADMIN — MIDAZOLAM 0.5 MG: 1 INJECTION INTRAMUSCULAR; INTRAVENOUS at 12:46

## 2019-04-25 RX ADMIN — FENTANYL CITRATE 25 MCG: 50 INJECTION, SOLUTION INTRAMUSCULAR; INTRAVENOUS at 11:08

## 2019-04-25 RX ADMIN — HEPARIN SODIUM 5000 UNITS: 1000 INJECTION INTRAVENOUS; SUBCUTANEOUS at 11:26

## 2019-04-25 RX ADMIN — SODIUM CHLORIDE 125 ML/HR: 0.9 INJECTION, SOLUTION INTRAVENOUS at 09:21

## 2019-04-25 RX ADMIN — FENTANYL CITRATE 25 MCG: 50 INJECTION, SOLUTION INTRAMUSCULAR; INTRAVENOUS at 12:47

## 2019-04-26 DIAGNOSIS — I42.8 NON-ISCHEMIC CARDIOMYOPATHY (HCC): ICD-10-CM

## 2019-04-26 DIAGNOSIS — I42.8 NICM (NONISCHEMIC CARDIOMYOPATHY) (HCC): ICD-10-CM

## 2019-04-26 DIAGNOSIS — I50.22 CHRONIC SYSTOLIC CHF (CONGESTIVE HEART FAILURE), NYHA CLASS 3 (HCC): ICD-10-CM

## 2019-04-26 DIAGNOSIS — I50.21 ACUTE SYSTOLIC (CONGESTIVE) HEART FAILURE (HCC): ICD-10-CM

## 2019-05-03 ENCOUNTER — TELEPHONE (OUTPATIENT)
Dept: CARDIOLOGY CLINIC | Facility: CLINIC | Age: 59
End: 2019-05-03

## 2019-05-07 ENCOUNTER — OFFICE VISIT (OUTPATIENT)
Dept: VASCULAR SURGERY | Facility: CLINIC | Age: 59
End: 2019-05-07
Payer: MEDICARE

## 2019-05-07 VITALS
DIASTOLIC BLOOD PRESSURE: 80 MMHG | BODY MASS INDEX: 25.48 KG/M2 | HEART RATE: 88 BPM | HEIGHT: 69 IN | SYSTOLIC BLOOD PRESSURE: 110 MMHG | WEIGHT: 172 LBS | TEMPERATURE: 98 F

## 2019-05-07 DIAGNOSIS — I70.211 ATHEROSCLEROSIS OF NATIVE ARTERY OF RIGHT LOWER EXTREMITY WITH INTERMITTENT CLAUDICATION (HCC): Primary | ICD-10-CM

## 2019-05-07 PROCEDURE — 99213 OFFICE O/P EST LOW 20 MIN: CPT | Performed by: SURGERY

## 2019-05-10 ENCOUNTER — PATIENT OUTREACH (OUTPATIENT)
Dept: CASE MANAGEMENT | Facility: OTHER | Age: 59
End: 2019-05-10

## 2019-05-15 ENCOUNTER — OFFICE VISIT (OUTPATIENT)
Dept: CARDIOLOGY CLINIC | Facility: CLINIC | Age: 59
End: 2019-05-15
Payer: MEDICARE

## 2019-05-15 VITALS
WEIGHT: 171.2 LBS | SYSTOLIC BLOOD PRESSURE: 118 MMHG | DIASTOLIC BLOOD PRESSURE: 58 MMHG | BODY MASS INDEX: 25.36 KG/M2 | HEIGHT: 69 IN | HEART RATE: 96 BPM

## 2019-05-15 DIAGNOSIS — I25.10 CORONARY ARTERY DISEASE INVOLVING NATIVE CORONARY ARTERY OF NATIVE HEART WITHOUT ANGINA PECTORIS: Primary | ICD-10-CM

## 2019-05-15 DIAGNOSIS — I70.211 ATHEROSCLEROSIS OF NATIVE ARTERY OF RIGHT LOWER EXTREMITY WITH INTERMITTENT CLAUDICATION (HCC): ICD-10-CM

## 2019-05-15 DIAGNOSIS — E78.5 DYSLIPIDEMIA: Chronic | ICD-10-CM

## 2019-05-15 DIAGNOSIS — I50.22 CHRONIC SYSTOLIC HEART FAILURE (HCC): ICD-10-CM

## 2019-05-15 DIAGNOSIS — I42.8 NON-ISCHEMIC CARDIOMYOPATHY (HCC): ICD-10-CM

## 2019-05-15 DIAGNOSIS — I80.8 SUPERFICIAL THROMBOPHLEBITIS OF LEFT UPPER EXTREMITY: ICD-10-CM

## 2019-05-15 PROCEDURE — 99214 OFFICE O/P EST MOD 30 MIN: CPT | Performed by: INTERNAL MEDICINE

## 2019-05-15 RX ORDER — SULFAMETHOXAZOLE AND TRIMETHOPRIM 800; 160 MG/1; MG/1
TABLET ORAL
Refills: 0 | COMMUNITY
Start: 2019-05-09

## 2019-05-28 ENCOUNTER — HOSPITAL ENCOUNTER (EMERGENCY)
Facility: HOSPITAL | Age: 59
Discharge: HOME | End: 2019-05-28
Attending: EMERGENCY MEDICINE | Admitting: EMERGENCY MEDICINE
Payer: MEDICARE

## 2019-05-28 VITALS
DIASTOLIC BLOOD PRESSURE: 90 MMHG | RESPIRATION RATE: 16 BRPM | WEIGHT: 174.6 LBS | TEMPERATURE: 97.5 F | BODY MASS INDEX: 25.78 KG/M2 | SYSTOLIC BLOOD PRESSURE: 148 MMHG | OXYGEN SATURATION: 99 % | HEART RATE: 89 BPM

## 2019-05-28 DIAGNOSIS — L03.032 PARONYCHIA OF GREAT TOE, LEFT: Primary | ICD-10-CM

## 2019-05-28 PROCEDURE — 99283 EMERGENCY DEPT VISIT LOW MDM: CPT

## 2019-05-28 PROCEDURE — 99283 EMERGENCY DEPT VISIT LOW MDM: CPT | Performed by: PHYSICIAN ASSISTANT

## 2019-05-28 RX ORDER — CLINDAMYCIN HYDROCHLORIDE 150 MG/1
300 CAPSULE ORAL EVERY 6 HOURS
Qty: 56 CAPSULE | Refills: 0 | Status: SHIPPED | OUTPATIENT
Start: 2019-05-28 | End: 2019-06-04

## 2019-05-28 RX ADMIN — Medication 1 APPLICATION: at 09:34

## 2019-05-29 ENCOUNTER — HOSPITAL ENCOUNTER (OUTPATIENT)
Dept: NON INVASIVE DIAGNOSTICS | Facility: CLINIC | Age: 59
Discharge: HOME | End: 2019-05-29
Payer: MEDICARE

## 2019-05-29 ENCOUNTER — PATIENT OUTREACH (OUTPATIENT)
Dept: CASE MANAGEMENT | Facility: OTHER | Age: 59
End: 2019-05-29

## 2019-05-29 DIAGNOSIS — I42.8 NICM (NONISCHEMIC CARDIOMYOPATHY) (HCC): ICD-10-CM

## 2019-05-29 DIAGNOSIS — I50.22 CHRONIC SYSTOLIC CHF (CONGESTIVE HEART FAILURE), NYHA CLASS 3 (HCC): ICD-10-CM

## 2019-05-29 PROCEDURE — 93306 TTE W/DOPPLER COMPLETE: CPT

## 2019-05-29 PROCEDURE — 93306 TTE W/DOPPLER COMPLETE: CPT | Performed by: INTERNAL MEDICINE

## 2019-07-02 DIAGNOSIS — I50.9 CHF (CONGESTIVE HEART FAILURE) (HCC): ICD-10-CM

## 2019-07-02 RX ORDER — FUROSEMIDE 20 MG/1
TABLET ORAL
Qty: 90 TABLET | Refills: 3 | Status: SHIPPED | OUTPATIENT
Start: 2019-07-02

## 2019-07-05 NOTE — PROGRESS NOTES
Heart Failure Outpatient Progress Note - Jose Maria Taveras 59 y o  male MRN: [id number]    @ Encounter: [id number]      Assessment/Plan:    Patient Active Problem List    Diagnosis Date Noted    Coronary artery disease involving native coronary artery of native heart without angina pectoris 03/07/2019     Priority: Low    Non-ischemic cardiomyopathy ([facility] ) 02/26/2019     Priority: Low    Chronic systolic heart failure ([facility] ) 02/26/2019     Priority: Low    Opioid dependence ([facility] ) 02/26/2019     Priority: Low    Type 2 myocardial infarction ([facility] ) 02/23/2019     Priority: Low    Peripheral vascular disease ([facility] ) 02/23/2019     Priority: Low    Elevated random blood glucose level 02/23/2019     Priority: Low    Atherosclerosis of native artery of right lower extremity with intermittent claudication ([facility] [facility] ) 02/15/2019     Priority: Low    Superficial thrombophlebitis 01/30/2019     Priority: Low    Atheroscler native arteries the extremities w/intermit claudication ([facility] ) 01/30/2019     Priority: Low    Dyslipidemia 01/15/2019     Priority: Low    Left wrist pain 01/15/2019     Priority: Low    Cervical disc disease 11/17/2017     Priority: Low    Cervical radiculopathy 11/17/2017     Priority: Low    Recurrent right inguinal hernia 01/31/2017     Priority: Low    Lumbar disc disease with radiculopathy 12/07/2016     Priority: Low    [facility] 11/08/2016     Priority: Low    Diverticulosis of large intestine without hemorrhage 11/08/2016     Priority: Low    Erectile dysfunction 11/08/2016     Priority: Low    Tobacco abuse disorder 11/08/2016     Priority: Low    Umbilical hernia without obstruction and without gangrene 11/08/2016     Priority: Low     # Chronic Systolic CHF, Stage C  Etiology: NICM, may be hypertensive given his EKG and history, possible ETOH  He states father and mother had CAD, chf    EKG: SR, LVH with strain    Echo 5/29/19:  LVEF: 25%  LVEDd: 6 7 cm  RV: normal  MV: mild MR    CMR 2/26/19:  EF: 20%  LVEDd 73 mm  Normal RV    LHC 2/25/19:   Mid LAD: There was a diffuse 30 % stenosis  Mid ramus intermedius: There was a diffuse 40 % stenosis  Proximal RCA: There was a 50 % stenosis    Weight: 171 lbs down from 177 lbs    Echocardiogram 2/24/19  LVEF: 15%  LVIDd: 5 5 cm  RV: normal with mild reduction  MR: mild    Neurohormonal Blockade:  --Beta-Blocker: Toprol XL 50 mg   --ACEi, ARB or ARNi: entresto 24/26 mg BID    (or SVR reduction)  --Aldosterone Receptor Blocker:  --Diuretic: lasix 20 mg daily- has not needed to take    Sudden Cardiac Death Risk Reduction:  --ICD: Had LifeVest, stopped wearing    Electrical Resynchronization:  --narrow QRS    Advanced Therapies (If appropriate): --Inotrope:  --LVAD/Transplant Candidacy:    # tobacco use- 1 PPD  # PAD with claudication  LEADs which show R: 0 78/52/27 and L: 1 17/71/59; R SFA occlusion, L popliteal stenosis  IR abdominal angiography/ intervention 4/25/19: Technically successful recanalization of long segment flush right SFA occlusion  # Hep C  # Lipids- 3/9/19: LDL 88, HDL 45  Atorvastatin 20 mg    TODAY'S PLAN:  Technically candidate for ICD (not CRT), although due to non compliance not technically 3 months of optimized medical management  Start spironolactone 25 mg daily  Doesn't think he is taking Toprol- start 25 mg daily  Stressed importance of stopping drinking etoh  --2g sodium diet  - Daily weights    HPI:      58 yo following up for NICM after admit to [facility] 2/23- 2/27 with progressive dyspnea, orthopnea, edema  He reported fever and chills  Trop was 5 56, NT pro   Echo showed EF: 15%, LVEDd5 38 cm  LHC showed non obstructive CAD  CMR was consistent with idiopathic etiology  His other history includes 1 ppd tobacco, significant vascular dz with claudication  He use to drink a good amount of etoh but is not clear on specifics, does not drink much now     He is presenting today for CV clearance for RL angiogram      Interval History:  Echo 5/29/19:  LVEF: 25%  LVEDd: 6 7 cm  RV: normal  MV: mild MR    IR abdominal angiography/ intervention 4/25/19: Technically successful recanalization of long segment flush right SFA occlusion    As per Dr Yumiko Ramon note 5/15: pt discontinued LifeVest on own; also stopped his meds then restarted  Still drinking ETOH and tobacco use  Was off meds 2 5 weeks, was fed up    Now getting entresto for free    Review of Systems   Constitutional: Negative for activity change, appetite change, fatigue and unexpected weight change  HENT: Negative for congestion and nosebleeds  Eyes: Negative  Respiratory: Negative for cough, chest tightness and shortness of breath  Cardiovascular: Negative for chest pain, palpitations and leg swelling  Gastrointestinal: Negative for abdominal distention  Endocrine: Negative  Genitourinary: Negative  Musculoskeletal: Positive for joint swelling (toe pain)  Skin: Negative  Neurological: Negative for dizziness, syncope and weakness  Hematological: Negative  Psychiatric/Behavioral: Negative  Past Medical History:   Diagnosis Date    [facility]congestive heart failure) ([facility])     Hepatitis C     HLD (hyperlipidemia)     PAD (peripheral artery disease) ([facility])     with claudication    PVD (peripheral vascular disease) ([facility])     SOB (shortness of breath)            Allergies   Allergen Reactions    Penicillins Other (See Comments)     Pt had convulsions as a child             Current Outpatient Medications:     aspirin (ECOTRIN LOW STRENGTH) 81 mg EC tablet, Take 1 tablet (81 mg total) by mouth daily, Disp: , Rfl:     atorvastatin (LIPITOR) 40 mg tablet, Take 1 tablet (40 mg total) by mouth daily after dinner, Disp: 30 tablet, Rfl: 0    clopidogrel (PLAVIX) 75 mg tablet, Take 1 tablet (75 mg total) by mouth daily, Disp: 30 tablet, Rfl: 2    cyclobenzaprine (FLEXERIL) 10 mg tablet, TAKE 1 TABLET (10 MG TOTAL) BY MOUTH 2 (TWO) TIMES A DAY AS NEEDED FOR MUSCLE SPASMS , Disp: , Rfl:     diazepam (VALIUM) 2 mg tablet, Take 1 tablet (2 mg total) by mouth every 8 (eight) hours as needed for muscle spasms for up to 10 days, Disp: 30 tablet, Rfl: 0    oxyCODONE-acetaminophen (PERCOCET) 5-325 mg per tablet, Take 1 tablet by mouth 2 (two) times a day as needed, Disp: , Rfl: 0    sacubitril-valsartan (ENTRESTO) 24-26 MG TABS, Take 1 tablet by mouth 2 (two) times a day, Disp: 180 tablet, Rfl: 2    atorvastatin (LIPITOR) 20 mg tablet, Take 20 mg by mouth daily, Disp: , Rfl: 4    diazepam (VALIUM) 5 mg tablet, Take 5 mg by mouth every 12 (twelve) hours as needed, Disp: , Rfl: 5    furosemide (LASIX) 20 mg tablet, TAKE 1 TABLET BY MOUTH EVERY DAY (Patient not taking: Reported on 7/8/2019), Disp: 90 tablet, Rfl: 3    gabapentin (NEURONTIN) 300 mg capsule, Take 300 mg by mouth Three times a day, Disp: , Rfl:     indomethacin (INDOCIN) 50 mg capsule, TAKE 1 CAPSULE BY MOUTH 3 TIMES A DAY AS NEEDED FOR MILD PAIN (PAIN SCORE 1-3) (GOUT)   WITH FOOD, Disp: , Rfl: 3    Magnesium 500 MG CAPS, Take by mouth, Disp: , Rfl:     meloxicam (MOBIC) 7 5 mg tablet, Take 1 tablet (7 5 mg total) by mouth daily (Patient not taking: Reported on 5/7/2019), Disp: 30 tablet, Rfl: 0    methylPREDNISolone 4 MG tablet therapy pack, TAKE 6 TABLETS ON DAY 1 AS DIRECTED ON PACKAGE AND DECREASE BY 1 TAB EACH DAY FOR A TOTAL OF 6 DAYS, Disp: , Rfl: 0    metoprolol succinate (TOPROL-XL) 50 mg 24 hr tablet, Take 1 tablet (50 mg total) by mouth daily (Patient not taking: Reported on 7/8/2019), Disp: 30 tablet, Rfl: 5    mupirocin (BACTROBAN) 2 % ointment, Apply 1 application topically 2 (two) times a day, Disp: , Rfl: 0    naproxen (NAPROSYN) 500 mg tablet, Take 500 mg by mouth daily , Disp: , Rfl:     polyethylene glycol (MIRALAX) 17 g packet, Take 17 g by mouth daily (Patient not taking: Reported on 5/7/2019), Disp: 14 each, Rfl: 0    senna (SENOKOT) 8 6 mg, Take 1 tablet (8 6 mg total) by mouth daily (Patient not taking: Reported on 5/7/2019), Disp: 120 each, Rfl: 0    sildenafil (REVATIO) 20 mg tablet, 3 TO 5 PILLS ORALLY X SINGLE DOSE DAILY PRN, Disp: , Rfl:     sulfamethoxazole-trimethoprim (BACTRIM DS) 800-160 mg per tablet, TAKE 1 TABLET BY MOUTH TWICE A DAY FOR 7 DAYS, Disp: , Rfl: 0    Social History     Socioeconomic History    Marital status: Single     Spouse name: Not on file    Number of children: Not on file    Years of education: Not on file    Highest education level: Not on file   Occupational History    Not on file   Social Needs    Financial resource strain: Not on file    Food insecurity:     Worry: Not on file     Inability: Not on file    Transportation needs:     Medical: Not on file     Non-medical: Not on file   Tobacco Use    Smoking status: Former Smoker     Packs/day: 0 50     Types: Cigarettes    Smokeless tobacco: Never Used   Substance and Sexual Activity    Alcohol use: Yes     Comment: social    Drug use: Not Currently    Sexual activity: Not on file   Lifestyle    Physical activity:     Days per week: Not on file     Minutes per session: Not on file    Stress: Not on file   Relationships    Social connections:     Talks on phone: Not on file     Gets together: Not on file     Attends Islam service: Not on file     Active member of club or organization: Not on file     Attends meetings of clubs or organizations: Not on file     Relationship status: Not on file    Intimate partner violence:     Fear of current or ex partner: Not on file     Emotionally abused: Not on file     Physically abused: Not on file     Forced sexual activity: Not on file   Other Topics Concern    Not on file   Social History Narrative    Not on file       Family History   Problem Relation Age of Onset    Diabetes Mother     Cancer Father        Physical Exam:    Vitals:   Vitals:    07/08/19 1505   BP: 118/70   Pulse: 89   SpO2: 98%     Wt Readings from Last 3 Encounters:   07/08/19 77 9 kg (171 lb 11 2 oz)   05/28/19 79 2 kg (174 lb 9 7 oz)   05/15/19 77 7 kg (171 lb 3 2 oz)         Physical Exam:    Physical Exam   Constitutional: He is oriented to person, place, and time  He appears well-developed and well-nourished  HENT:   Head: Normocephalic and atraumatic  Eyes: Pupils are equal, round, and reactive to light  EOM are normal    Neck: Normal range of motion  No JVD present  Cardiovascular: Normal rate, regular rhythm and normal heart sounds  No murmur heard  Pulmonary/Chest: Effort normal and breath sounds normal  He has no rales  Abdominal: Soft  Bowel sounds are normal  He exhibits no distension  Musculoskeletal: Normal range of motion  He exhibits no edema  Neurological: He is alert and oriented to person, place, and time  Skin: Skin is warm and dry  He is not diaphoretic  Psychiatric: He has a normal mood and affect  Labs & Results:    Lab Results   Component Value Date    GLUCOSE 110 07/23/2015    CALCIUM 8 7 04/18/2019     07/23/2015    SODIUM 143 04/18/2019    K 3 8 04/18/2019    CO2 24 04/18/2019     (H) 04/18/2019    BUN 26 (H) 04/18/2019    CREATININE 1 30 04/18/2019     Lab Results   Component Value Date    WBC 10 44 (H) 04/18/2019    HGB 14 4 04/18/2019    HCT 43 9 04/18/2019    MCV 91 04/18/2019     04/18/2019     Lab Results   Component Value Date    NTBNP 3,544 (H) 02/23/2019      No results found for: CHOL  Lab Results   Component Value Date    HDL 47 02/24/2019     Lab Results   Component Value Date    LDLCALC 68 02/24/2019     Lab Results   Component Value Date    TRIG 92 02/24/2019     No results found for: [place], [place]    EKG personally reviewed by Clint Duran DO  Counseling / Coordination of Care  Total floor / unit time spent today 25 minutes  Greater than 50% of total time was spent with the patient and / or family counseling and / or coordination of care    A description of the counseling / coordination of care: 15      Thank you for the opportunity to participate in the care of this patient    SVITLANA NELSON [address]

## 2019-07-08 ENCOUNTER — OFFICE VISIT (OUTPATIENT)
Dept: CARDIOLOGY CLINIC | Facility: CLINIC | Age: 59
End: 2019-07-08
Payer: MEDICARE

## 2019-07-08 VITALS
HEIGHT: 69 IN | WEIGHT: 171.7 LBS | HEART RATE: 89 BPM | BODY MASS INDEX: 25.43 KG/M2 | OXYGEN SATURATION: 98 % | SYSTOLIC BLOOD PRESSURE: 118 MMHG | DIASTOLIC BLOOD PRESSURE: 70 MMHG

## 2019-07-08 DIAGNOSIS — I42.8 NICM (NONISCHEMIC CARDIOMYOPATHY) (HCC): ICD-10-CM

## 2019-07-08 DIAGNOSIS — Z72.0 TOBACCO USE: ICD-10-CM

## 2019-07-08 DIAGNOSIS — I50.9 CHF (CONGESTIVE HEART FAILURE) (HCC): ICD-10-CM

## 2019-07-08 DIAGNOSIS — I50.22 CHRONIC SYSTOLIC CHF (CONGESTIVE HEART FAILURE), NYHA CLASS 3 (HCC): Primary | ICD-10-CM

## 2019-07-08 PROCEDURE — 99214 OFFICE O/P EST MOD 30 MIN: CPT | Performed by: INTERNAL MEDICINE

## 2019-07-08 RX ORDER — METHYLPREDNISOLONE 4 MG/1
TABLET ORAL
Refills: 0 | COMMUNITY
Start: 2019-06-12

## 2019-07-08 RX ORDER — METOPROLOL SUCCINATE 25 MG/1
25 TABLET, EXTENDED RELEASE ORAL DAILY
Qty: 90 TABLET | Refills: 3 | Status: SHIPPED | OUTPATIENT
Start: 2019-07-08

## 2019-07-08 RX ORDER — SPIRONOLACTONE 25 MG/1
25 TABLET ORAL DAILY
Qty: 30 TABLET | Refills: 4 | Status: SHIPPED | OUTPATIENT
Start: 2019-07-08

## 2019-07-08 RX ORDER — OXYCODONE HYDROCHLORIDE AND ACETAMINOPHEN 5; 325 MG/1; MG/1
1 TABLET ORAL 2 TIMES DAILY PRN
Refills: 0 | COMMUNITY
Start: 2019-06-12

## 2019-07-08 RX ORDER — ATORVASTATIN CALCIUM 20 MG/1
20 TABLET, FILM COATED ORAL DAILY
Refills: 4 | COMMUNITY
Start: 2019-05-16

## 2019-07-08 RX ORDER — GABAPENTIN 300 MG/1
300 CAPSULE ORAL 3 TIMES DAILY
COMMUNITY

## 2019-07-08 RX ORDER — INDOMETHACIN 50 MG/1
CAPSULE ORAL
Refills: 3 | COMMUNITY
Start: 2019-06-12

## 2019-07-08 RX ORDER — DIAZEPAM 5 MG/1
5 TABLET ORAL EVERY 12 HOURS PRN
Refills: 5 | COMMUNITY
Start: 2019-06-12

## 2019-07-08 NOTE — PATIENT INSTRUCTIONS
Continue entresto  Start taking metoprolol xl 25 mg daily  Spironolactone 25 mg     I ordered labs for one week

## 2019-07-19 ENCOUNTER — TELEPHONE (OUTPATIENT)
Dept: VASCULAR SURGERY | Facility: CLINIC | Age: 59
End: 2019-07-19

## 2019-07-19 NOTE — TELEPHONE ENCOUNTER
[facility] pharmacy called to get a refill on plavix for an additional 90 days  oked per dr Sophie Billy chart note

## 2019-07-29 ENCOUNTER — TELEPHONE (OUTPATIENT)
Dept: CARDIOLOGY CLINIC | Facility: CLINIC | Age: 59
End: 2019-07-29

## 2019-10-04 DIAGNOSIS — I50.9 CHF (CONGESTIVE HEART FAILURE) (HCC): ICD-10-CM

## 2019-10-04 RX ORDER — METOPROLOL SUCCINATE 50 MG/1
TABLET, EXTENDED RELEASE ORAL
Qty: 90 TABLET | Refills: 1 | Status: SHIPPED | OUTPATIENT
Start: 2019-10-04

## 2019-11-21 DIAGNOSIS — I42.8 NICM (NONISCHEMIC CARDIOMYOPATHY) (HCC): ICD-10-CM

## 2019-11-21 DIAGNOSIS — I42.8 NON-ISCHEMIC CARDIOMYOPATHY (HCC): ICD-10-CM

## 2019-11-21 DIAGNOSIS — I50.21 ACUTE SYSTOLIC (CONGESTIVE) HEART FAILURE (HCC): ICD-10-CM

## 2019-11-21 DIAGNOSIS — I50.22 CHRONIC SYSTOLIC CHF (CONGESTIVE HEART FAILURE), NYHA CLASS 3 (HCC): ICD-10-CM

## 2020-02-28 ENCOUNTER — DOCUMENTATION (OUTPATIENT)
Dept: CARDIOLOGY CLINIC | Facility: CLINIC | Age: 60
End: 2020-02-28

## 2020-02-28 NOTE — PROGRESS NOTES
Patient was informed to call the [facility]-[phone]  for future funding on Entresto-this is a first come first serve basis    If he is denied he will need to call [phone] for further assistance

## 2021-03-26 ENCOUNTER — IMMUNIZATIONS (OUTPATIENT)
Dept: FAMILY MEDICINE CLINIC | Facility: HOSPITAL | Age: 61
End: 2021-03-26

## 2021-03-26 DIAGNOSIS — Z23 ENCOUNTER FOR IMMUNIZATION: Primary | ICD-10-CM

## 2021-03-26 PROCEDURE — 0001A SARS-COV-2 / COVID-19 MRNA VACCINE (PFIZER-BIONTECH) 30 MCG: CPT

## 2021-03-26 PROCEDURE — 91300 SARS-COV-2 / COVID-19 MRNA VACCINE (PFIZER-BIONTECH) 30 MCG: CPT

## 2021-04-16 ENCOUNTER — IMMUNIZATIONS (OUTPATIENT)
Dept: FAMILY MEDICINE CLINIC | Facility: HOSPITAL | Age: 61
End: 2021-04-16

## 2021-04-16 DIAGNOSIS — Z23 ENCOUNTER FOR IMMUNIZATION: Primary | ICD-10-CM

## 2021-04-16 PROCEDURE — 91300 SARS-COV-2 / COVID-19 MRNA VACCINE (PFIZER-BIONTECH) 30 MCG: CPT

## 2021-04-16 PROCEDURE — 0002A SARS-COV-2 / COVID-19 MRNA VACCINE (PFIZER-BIONTECH) 30 MCG: CPT

## 2021-11-30 ENCOUNTER — IMMUNIZATIONS (OUTPATIENT)
Dept: FAMILY MEDICINE CLINIC | Facility: HOSPITAL | Age: 61
End: 2021-11-30

## 2021-11-30 DIAGNOSIS — Z23 ENCOUNTER FOR IMMUNIZATION: Primary | ICD-10-CM

## 2021-11-30 PROCEDURE — 0001A COVID-19 PFIZER VACC 0.3 ML: CPT

## 2021-11-30 PROCEDURE — 91300 COVID-19 PFIZER VACC 0.3 ML: CPT
